# Patient Record
Sex: FEMALE | Race: WHITE | NOT HISPANIC OR LATINO | Employment: FULL TIME | URBAN - METROPOLITAN AREA
[De-identification: names, ages, dates, MRNs, and addresses within clinical notes are randomized per-mention and may not be internally consistent; named-entity substitution may affect disease eponyms.]

---

## 2017-02-20 ENCOUNTER — OFFICE VISIT (OUTPATIENT)
Dept: FAMILY MEDICINE | Facility: OTHER | Age: 37
End: 2017-02-20
Payer: COMMERCIAL

## 2017-02-20 VITALS
HEIGHT: 64 IN | HEART RATE: 72 BPM | TEMPERATURE: 96.8 F | SYSTOLIC BLOOD PRESSURE: 128 MMHG | WEIGHT: 166.2 LBS | BODY MASS INDEX: 28.38 KG/M2 | RESPIRATION RATE: 20 BRPM | DIASTOLIC BLOOD PRESSURE: 78 MMHG

## 2017-02-20 DIAGNOSIS — J06.9 UPPER RESPIRATORY TRACT INFECTION, UNSPECIFIED TYPE: ICD-10-CM

## 2017-02-20 DIAGNOSIS — R07.0 THROAT PAIN: Primary | ICD-10-CM

## 2017-02-20 LAB
DEPRECATED S PYO AG THROAT QL EIA: NORMAL
FLUAV+FLUBV AG SPEC QL: NEGATIVE
FLUAV+FLUBV AG SPEC QL: NORMAL
MICRO REPORT STATUS: NORMAL
SPECIMEN SOURCE: NORMAL
SPECIMEN SOURCE: NORMAL

## 2017-02-20 PROCEDURE — 87880 STREP A ASSAY W/OPTIC: CPT | Performed by: PHYSICIAN ASSISTANT

## 2017-02-20 PROCEDURE — 87081 CULTURE SCREEN ONLY: CPT | Performed by: PHYSICIAN ASSISTANT

## 2017-02-20 PROCEDURE — 99213 OFFICE O/P EST LOW 20 MIN: CPT | Performed by: PHYSICIAN ASSISTANT

## 2017-02-20 PROCEDURE — 87804 INFLUENZA ASSAY W/OPTIC: CPT | Performed by: PHYSICIAN ASSISTANT

## 2017-02-20 ASSESSMENT — PAIN SCALES - GENERAL: PAINLEVEL: SEVERE PAIN (6)

## 2017-02-20 NOTE — NURSING NOTE
"Chief Complaint   Patient presents with     URI     Pharyngitis     Panel Management     MyChart, Flu shot, Pap       Initial /78 (BP Location: Right arm, Patient Position: Chair, Cuff Size: Adult Regular)  Pulse 72  Temp 96.8  F (36  C) (Temporal)  Resp 20  Ht 5' 3.5\" (1.613 m)  Wt 166 lb 3.2 oz (75.4 kg)  BMI 28.98 kg/m2 Estimated body mass index is 28.98 kg/(m^2) as calculated from the following:    Height as of this encounter: 5' 3.5\" (1.613 m).    Weight as of this encounter: 166 lb 3.2 oz (75.4 kg).  Medication Reconciliation: izzy Kim, ANUPAM    "

## 2017-02-20 NOTE — PROGRESS NOTES
"  SUBJECTIVE:                                                    Kirstie Stoll is a 36 year old female who presents to clinic today for the following health issues:      Acute Illness   Acute illness concerns: sore throat  Onset: 1 week    Fever: YES    Chills/Sweats: YES    Headache (location?): YES    Sinus Pressure:YES    Conjunctivitis:  no    Ear Pain: YES: both    Rhinorrhea: YES    Congestion: YES    Sore Throat: YES     Cough: YES    Wheeze: YES    Decreased Appetite: no    Nausea: no    Vomiting: no    Diarrhea:  YES    Dysuria/Freq.: no    Fatigue/Achiness: YES    Sick/Strep Exposure: YES- co-worker on Monday has a staph infection     Therapies Tried and outcome: dayquil, ibuprofen, nyquil      -------------------------------------    Problem list and histories reviewed & adjusted, as indicated.  Additional history: as documented    BP Readings from Last 3 Encounters:   02/20/17 128/78   04/15/16 130/84   10/19/15 116/68    Wt Readings from Last 3 Encounters:   02/20/17 166 lb 3.2 oz (75.4 kg)   04/15/16 163 lb 12.8 oz (74.3 kg)   10/19/15 160 lb (72.6 kg)         Problem list, Medication list, Allergies, and Medical/Social/Surgical histories reviewed in EPIC and updated as appropriate.    ROS:  Constitutional, HEENT, cardiovascular, pulmonary, gi and gu systems are negative, except as otherwise noted.    OBJECTIVE:                                                    /78 (BP Location: Right arm, Patient Position: Chair, Cuff Size: Adult Regular)  Pulse 72  Temp 96.8  F (36  C) (Temporal)  Resp 20  Ht 5' 3.5\" (1.613 m)  Wt 166 lb 3.2 oz (75.4 kg)  BMI 28.98 kg/m2  Body mass index is 28.98 kg/(m^2).  GENERAL: alert and no distress  EYES: Eyes grossly normal to inspection, PERRL and conjunctivae and sclerae normal  HENT: normal cephalic/atraumatic, both ears: clear effusion, rhinorrhea clear, oropharynx clear, oral mucous membranes moist and sinuses: frontal tenderness on both sides  NECK: no " adenopathy  RESP: lungs clear to auscultation - no rales, rhonchi or wheezes  CV: regular rates and rhythm, peripheral pulses strong and no peripheral edema  MS: no gross musculoskeletal defects noted, no edema  SKIN: no suspicious lesions or rashes    Diagnostic Test Results:  Results for orders placed or performed in visit on 02/20/17 (from the past 24 hour(s))   Strep, Rapid Screen   Result Value Ref Range    Specimen Description Throat     Rapid Strep A Screen       NEGATIVE: No Group A streptococcal antigen detected by immunoassay, await   culture report.      Micro Report Status FINAL 02/20/2017    Influenza A/B antigen   Result Value Ref Range    Influenza A/B Agn Specimen Nasal     Influenza A Negative NEG    Influenza B  NEG     Negative   Test results must be correlated with clinical data. If necessary, results   should be confirmed by a molecular assay or viral culture.          ASSESSMENT/PLAN:                                                        ICD-10-CM    1. Throat pain R07.0 Strep, Rapid Screen     Influenza A/B antigen     Beta strep group A culture   2. Upper respiratory tract infection, unspecified type J06.9        See Patient Instructions    Cira Larios PA-C  [facility]

## 2017-02-20 NOTE — PATIENT INSTRUCTIONS
Sinusitis (No Antibiotics)    The sinuses are air-filled spaces within the bones of the face. They connect to the inside of the nose. Sinusitis is an inflammation of the tissue lining the sinus cavity. Sinus inflammation can occur during a cold. It can also be due to allergies to pollens and other particles in the air. It can cause symptoms such as sinus congestion, headache, sore throat, facial swelling and fullness. It may also cause a low-grade fever. No infection is present, and no antibiotic treatment is needed.  Home care    Drink plenty of water, hot tea, and other liquids. This may help thin mucus. It also may promote sinus drainage.    Heat may help soothe painful areas of the face. Use a towel soaked in hot water. Or,  the shower and direct the hot spray onto your face. Using a vaporizer along with a menthol rub at night may also help.     An expectorant containing guaifenesin may help thin the mucus and promote drainage from the sinuses.    Over-the-counter decongestants may be used unless a similar medicine was prescribed. Nasal sprays work the fastest. Use one that contains phenylephrine or oxymetazoline. First blow the nose gently. Then use the spray. Do not use these medicines more often than directed on the label or symptoms may get worse. You may also use tablets containing pseudoephedrine. Avoid products that combine ingredients, because side effects may be increased. Read labels. You can also ask the pharmacist for help. (NOTE: Persons with high blood pressure should not use decongestants. They can raise blood pressure.)    Over-the-counter antihistamines may help if allergies contributed to your sinusitis.      Use acetaminophen or ibuprofen to control pain, unless another pain medicine was prescribed. (If you have chronic liver or kidney disease or ever had a stomach ulcer, talk with your doctor before using these medicines. Aspirin should never be used in anyone under 18 years of age  who is ill with a fever. It may cause severe liver damage.)    Use nasal rinses or irrigation as instructed by your health care provider.    Don't smoke. This can worsen symptoms.  Follow-up care  Follow up with your healthcare provider or our staff if you are not improving within the next week.  When to seek medical advice  Call your healthcare provider if any of these occur:    Green or yellow discharge from the nose or into the throat    Facial pain or headache becoming more severe    Stiff neck    Unusual drowsiness or confusion    Swelling of the forehead or eyelids    Vision problems, including blurred or double vision    Fever of 100.4 F (38 C) or higher, or as directed by your healthcare provider    Seizure    Breathing problems    Symptoms not resolving within 10 days    0252-4120 [employer]. [address] [place] [address], [place] [address]. All rights reserved. This information is not intended as a substitute for professional medical care. Always follow your healthcare professional's instructions.

## 2017-02-20 NOTE — MR AVS SNAPSHOT
After Visit Summary   2/20/2017    Kirstie Stoll    MRN: [id number]           Patient Information     Date Of Birth          1980        Visit Information        Provider Department      2/20/2017 2:40 PM Cira Larios PA-C [facility]        Today's Diagnoses     Throat pain    -  1    Upper respiratory tract infection, unspecified type          Care Instructions      Sinusitis (No Antibiotics)    The sinuses are air-filled spaces within the bones of the face. They connect to the inside of the nose. Sinusitis is an inflammation of the tissue lining the sinus cavity. Sinus inflammation can occur during a cold. It can also be due to allergies to pollens and other particles in the air. It can cause symptoms such as sinus congestion, headache, sore throat, facial swelling and fullness. It may also cause a low-grade fever. No infection is present, and no antibiotic treatment is needed.  Home care    Drink plenty of water, hot tea, and other liquids. This may help thin mucus. It also may promote sinus drainage.    Heat may help soothe painful areas of the face. Use a towel soaked in hot water. Or,  the shower and direct the hot spray onto your face. Using a vaporizer along with a menthol rub at night may also help.     An expectorant containing guaifenesin may help thin the mucus and promote drainage from the sinuses.    Over-the-counter decongestants may be used unless a similar medicine was prescribed. Nasal sprays work the fastest. Use one that contains phenylephrine or oxymetazoline. First blow the nose gently. Then use the spray. Do not use these medicines more often than directed on the label or symptoms may get worse. You may also use tablets containing pseudoephedrine. Avoid products that combine ingredients, because side effects may be increased. Read labels. You can also ask the pharmacist for help. (NOTE: Persons with high blood pressure should not use  decongestants. They can raise blood pressure.)    Over-the-counter antihistamines may help if allergies contributed to your sinusitis.      Use acetaminophen or ibuprofen to control pain, unless another pain medicine was prescribed. (If you have chronic liver or kidney disease or ever had a stomach ulcer, talk with your doctor before using these medicines. Aspirin should never be used in anyone under 18 years of age who is ill with a fever. It may cause severe liver damage.)    Use nasal rinses or irrigation as instructed by your health care provider.    Don't smoke. This can worsen symptoms.  Follow-up care  Follow up with your healthcare provider or our staff if you are not improving within the next week.  When to seek medical advice  Call your healthcare provider if any of these occur:    Green or yellow discharge from the nose or into the throat    Facial pain or headache becoming more severe    Stiff neck    Unusual drowsiness or confusion    Swelling of the forehead or eyelids    Vision problems, including blurred or double vision    Fever of 100.4 F (38 C) or higher, or as directed by your healthcare provider    Seizure    Breathing problems    Symptoms not resolving within 10 days    5252-0757 [employer]. [address] [place] [address]. All rights reserved. This information is not intended as a substitute for professional medical care. Always follow your healthcare professional's instructions.              Follow-ups after your visit        Follow-up notes from your care team     Return if symptoms worsen or fail to improve.      Who to contact     If you have questions or need follow up information about today's clinic visit or your schedule please contact [facility] directly at [phone].  Normal or non-critical lab and imaging results will be communicated to you by MyChart, letter or phone within 4 business days after the clinic has received the results. If you  "do not hear from us within 7 days, please contact the clinic through [employer] or phone. If you have a critical or abnormal lab result, we will notify you by phone as soon as possible.  Submit refill requests through [employer] or call your pharmacy and they will forward the refill request to us. Please allow 3 business days for your refill to be completed.          Additional Information About Your Visit        [employer] Information     [employer] lets you send messages to your doctor, view your test results, renew your prescriptions, schedule appointments and more. To sign up, go to [email or web address] . Click on \"Log in\" on the left side of the screen, which will take you to the Welcome page. Then click on \"Sign up Now\" on the right side of the page.     You will be asked to enter the access code listed below, as well as some personal information. Please follow the directions to create your username and password.     Your access code is: [email or web address]  Expires: 2017  3:04 PM     Your access code will  in 90 days. If you need help or a new code, please call your [facility] clinic or [phone].        Care EveryWhere ID     This is your Care EveryWhere ID. This could be used by other organizations to access your [facility] medical records  [id number]        Your Vitals Were     Pulse Temperature Respirations Height BMI (Body Mass Index)       72 96.8  F (36  C) (Temporal) 20 5' 3.5\" (1.613 m) 28.98 kg/m2        Blood Pressure from Last 3 Encounters:   17 128/78   04/15/16 130/84   10/19/15 116/68    Weight from Last 3 Encounters:   17 166 lb 3.2 oz (75.4 kg)   04/15/16 163 lb 12.8 oz (74.3 kg)   10/19/15 160 lb (72.6 kg)              We Performed the Following     Beta strep group A culture     Influenza A/B antigen     Strep, Rapid Screen        Primary Care Provider Office Phone # Fax #    Inez Salter [phone]       [address] " [address]        Thank you!     Thank you for choosing [facility]  for your care. Our goal is always to provide you with excellent care. Hearing back from our patients is one way we can continue to improve our services. Please take a few minutes to complete the written survey that you may receive in the mail after your visit with us. Thank you!             Your Updated Medication List - Protect others around you: Learn how to safely use, store and throw away your medicines at www.disposemymeds.org.          This list is accurate as of: 2/20/17  3:04 PM.  Always use your most recent med list.                   Brand Name Dispense Instructions for use    albuterol 108 (90 BASE) MCG/ACT Inhaler    PROAIR HFA/PROVENTIL HFA/VENTOLIN HFA    1 Inhaler    Inhale 2 puffs into the lungs every 6 hours as needed for shortness of breath / dyspnea or wheezing

## 2017-02-22 LAB
BACTERIA SPEC CULT: NORMAL
MICRO REPORT STATUS: NORMAL
SPECIMEN SOURCE: NORMAL

## 2017-03-15 ENCOUNTER — TELEPHONE (OUTPATIENT)
Dept: FAMILY MEDICINE | Facility: OTHER | Age: 37
End: 2017-03-15

## 2017-03-15 NOTE — TELEPHONE ENCOUNTER
Summary:    Patient is due/failing the following:   PAP    Action needed:   Patient needs office visit for PAP.    Type of outreach:    Phone, spoke to patient.  patient declines at this time.     Questions for provider review:    None                                                                                                                                    Maira Ramirez       Chart routed to Care Team .        Panel Management Review      Patient has the following on her problem list: None      Composite cancer screening  Chart review shows that this patient is due/due soon for the following Pap Smear

## 2017-05-26 ENCOUNTER — HEALTH MAINTENANCE LETTER (OUTPATIENT)
Age: 37
End: 2017-05-26

## 2017-06-18 ENCOUNTER — HOSPITAL ENCOUNTER (EMERGENCY)
Facility: CLINIC | Age: 37
Discharge: HOME | End: 2017-06-18
Attending: FAMILY MEDICINE | Admitting: FAMILY MEDICINE
Payer: COMMERCIAL

## 2017-06-18 ENCOUNTER — APPOINTMENT (OUTPATIENT)
Dept: CT IMAGING | Facility: CLINIC | Age: 37
End: 2017-06-18
Attending: FAMILY MEDICINE
Payer: COMMERCIAL

## 2017-06-18 VITALS
SYSTOLIC BLOOD PRESSURE: 125 MMHG | RESPIRATION RATE: 16 BRPM | HEIGHT: 64 IN | HEART RATE: 104 BPM | BODY MASS INDEX: 28.38 KG/M2 | WEIGHT: 166.25 LBS | DIASTOLIC BLOOD PRESSURE: 91 MMHG | OXYGEN SATURATION: 97 % | TEMPERATURE: 98 F

## 2017-06-18 DIAGNOSIS — A08.4 VIRAL GASTROENTERITIS: ICD-10-CM

## 2017-06-18 LAB
ALBUMIN SERPL-MCNC: 3.6 G/DL (ref 3.4–5)
ALBUMIN UR-MCNC: NEGATIVE MG/DL
ALP SERPL-CCNC: 94 U/L (ref 40–150)
ALT SERPL W P-5'-P-CCNC: 20 U/L (ref 0–50)
ANION GAP SERPL CALCULATED.3IONS-SCNC: 7 MMOL/L (ref 3–14)
APPEARANCE UR: ABNORMAL
AST SERPL W P-5'-P-CCNC: 16 U/L (ref 0–45)
BACTERIA #/AREA URNS HPF: ABNORMAL /HPF
BASOPHILS # BLD AUTO: 0.1 10E9/L (ref 0–0.2)
BASOPHILS NFR BLD AUTO: 1.1 %
BILIRUB SERPL-MCNC: 0.4 MG/DL (ref 0.2–1.3)
BILIRUB UR QL STRIP: NEGATIVE
BUN SERPL-MCNC: 11 MG/DL (ref 7–30)
CALCIUM SERPL-MCNC: 8.2 MG/DL (ref 8.5–10.1)
CHLORIDE SERPL-SCNC: 109 MMOL/L (ref 94–109)
CO2 SERPL-SCNC: 26 MMOL/L (ref 20–32)
COLOR UR AUTO: YELLOW
CREAT SERPL-MCNC: 0.75 MG/DL (ref 0.52–1.04)
DIFFERENTIAL METHOD BLD: NORMAL
EOSINOPHIL # BLD AUTO: 0.3 10E9/L (ref 0–0.7)
EOSINOPHIL NFR BLD AUTO: 4.3 %
ERYTHROCYTE [DISTWIDTH] IN BLOOD BY AUTOMATED COUNT: 12.6 % (ref 10–15)
GFR SERPL CREATININE-BSD FRML MDRD: 87 ML/MIN/1.7M2
GLUCOSE SERPL-MCNC: 100 MG/DL (ref 70–99)
GLUCOSE UR STRIP-MCNC: NEGATIVE MG/DL
HCG UR QL: NEGATIVE
HCT VFR BLD AUTO: 40.6 % (ref 35–47)
HGB BLD-MCNC: 13.8 G/DL (ref 11.7–15.7)
HGB UR QL STRIP: NEGATIVE
IMM GRANULOCYTES # BLD: 0 10E9/L (ref 0–0.4)
IMM GRANULOCYTES NFR BLD: 0.5 %
KETONES UR STRIP-MCNC: NEGATIVE MG/DL
LEUKOCYTE ESTERASE UR QL STRIP: ABNORMAL
LIPASE SERPL-CCNC: 191 U/L (ref 73–393)
LYMPHOCYTES # BLD AUTO: 1.8 10E9/L (ref 0.8–5.3)
LYMPHOCYTES NFR BLD AUTO: 26.9 %
MCH RBC QN AUTO: 29.8 PG (ref 26.5–33)
MCHC RBC AUTO-ENTMCNC: 34 G/DL (ref 31.5–36.5)
MCV RBC AUTO: 88 FL (ref 78–100)
MONOCYTES # BLD AUTO: 0.9 10E9/L (ref 0–1.3)
MONOCYTES NFR BLD AUTO: 13.8 %
MUCOUS THREADS #/AREA URNS LPF: PRESENT /LPF
NEUTROPHILS # BLD AUTO: 3.5 10E9/L (ref 1.6–8.3)
NEUTROPHILS NFR BLD AUTO: 53.4 %
NITRATE UR QL: NEGATIVE
PH UR STRIP: 9 PH (ref 5–7)
PLATELET # BLD AUTO: 313 10E9/L (ref 150–450)
POTASSIUM SERPL-SCNC: 4 MMOL/L (ref 3.4–5.3)
PROT SERPL-MCNC: 7 G/DL (ref 6.8–8.8)
RBC # BLD AUTO: 4.63 10E12/L (ref 3.8–5.2)
RBC #/AREA URNS AUTO: 3 /HPF (ref 0–2)
SODIUM SERPL-SCNC: 142 MMOL/L (ref 133–144)
SP GR UR STRIP: 1.02 (ref 1–1.03)
SQUAMOUS #/AREA URNS AUTO: 7 /HPF (ref 0–1)
URN SPEC COLLECT METH UR: ABNORMAL
UROBILINOGEN UR STRIP-MCNC: 0 MG/DL (ref 0–2)
WBC # BLD AUTO: 6.5 10E9/L (ref 4–11)
WBC #/AREA URNS AUTO: 1 /HPF (ref 0–2)

## 2017-06-18 PROCEDURE — 36415 COLL VENOUS BLD VENIPUNCTURE: CPT | Performed by: FAMILY MEDICINE

## 2017-06-18 PROCEDURE — 99285 EMERGENCY DEPT VISIT HI MDM: CPT | Mod: 25 | Performed by: FAMILY MEDICINE

## 2017-06-18 PROCEDURE — 25000128 H RX IP 250 OP 636: Performed by: FAMILY MEDICINE

## 2017-06-18 PROCEDURE — 99285 EMERGENCY DEPT VISIT HI MDM: CPT | Mod: Z6 | Performed by: FAMILY MEDICINE

## 2017-06-18 PROCEDURE — 96374 THER/PROPH/DIAG INJ IV PUSH: CPT | Mod: 59 | Performed by: FAMILY MEDICINE

## 2017-06-18 PROCEDURE — 81025 URINE PREGNANCY TEST: CPT | Performed by: FAMILY MEDICINE

## 2017-06-18 PROCEDURE — 80053 COMPREHEN METABOLIC PANEL: CPT | Performed by: FAMILY MEDICINE

## 2017-06-18 PROCEDURE — 25000125 ZZHC RX 250: Performed by: FAMILY MEDICINE

## 2017-06-18 PROCEDURE — 83690 ASSAY OF LIPASE: CPT | Performed by: FAMILY MEDICINE

## 2017-06-18 PROCEDURE — 81001 URINALYSIS AUTO W/SCOPE: CPT | Performed by: FAMILY MEDICINE

## 2017-06-18 PROCEDURE — 74177 CT ABD & PELVIS W/CONTRAST: CPT

## 2017-06-18 PROCEDURE — 85025 COMPLETE CBC W/AUTO DIFF WBC: CPT | Performed by: FAMILY MEDICINE

## 2017-06-18 PROCEDURE — 96375 TX/PRO/DX INJ NEW DRUG ADDON: CPT | Performed by: FAMILY MEDICINE

## 2017-06-18 RX ORDER — HYDROMORPHONE HYDROCHLORIDE 1 MG/ML
0.5 INJECTION, SOLUTION INTRAMUSCULAR; INTRAVENOUS; SUBCUTANEOUS
Status: DISCONTINUED | OUTPATIENT
Start: 2017-06-18 | End: 2017-06-18 | Stop reason: HOSPADM

## 2017-06-18 RX ORDER — HYDROCODONE BITARTRATE AND ACETAMINOPHEN 5; 325 MG/1; MG/1
1-2 TABLET ORAL EVERY 4 HOURS PRN
Qty: 15 TABLET | Refills: 0 | Status: SHIPPED | OUTPATIENT
Start: 2017-06-18 | End: 2018-03-02

## 2017-06-18 RX ORDER — LIDOCAINE 40 MG/G
CREAM TOPICAL
Status: DISCONTINUED | OUTPATIENT
Start: 2017-06-18 | End: 2017-06-18 | Stop reason: HOSPADM

## 2017-06-18 RX ORDER — CALCIUM CARBONATE 500 MG/1
1 TABLET, CHEWABLE ORAL DAILY
COMMUNITY
End: 2018-03-02

## 2017-06-18 RX ORDER — IOPAMIDOL 755 MG/ML
100 INJECTION, SOLUTION INTRAVASCULAR ONCE
Status: COMPLETED | OUTPATIENT
Start: 2017-06-18 | End: 2017-06-18

## 2017-06-18 RX ORDER — KETOROLAC TROMETHAMINE 30 MG/ML
30 INJECTION, SOLUTION INTRAMUSCULAR; INTRAVENOUS ONCE
Status: COMPLETED | OUTPATIENT
Start: 2017-06-18 | End: 2017-06-18

## 2017-06-18 RX ADMIN — HYDROMORPHONE HYDROCHLORIDE 0.5 MG: 1 INJECTION, SOLUTION INTRAMUSCULAR; INTRAVENOUS; SUBCUTANEOUS at 16:18

## 2017-06-18 RX ADMIN — IOPAMIDOL 85 ML: 755 INJECTION, SOLUTION INTRAVENOUS at 17:53

## 2017-06-18 RX ADMIN — KETOROLAC TROMETHAMINE 30 MG: 30 INJECTION, SOLUTION INTRAMUSCULAR at 16:18

## 2017-06-18 RX ADMIN — SODIUM CHLORIDE 60 ML: 9 INJECTION, SOLUTION INTRAVENOUS at 17:53

## 2017-06-18 ASSESSMENT — ENCOUNTER SYMPTOMS
LIGHT-HEADEDNESS: 1
NAUSEA: 1
DIARRHEA: 1
FEVER: 0
CHILLS: 0
DYSURIA: 0
FREQUENCY: 0
ABDOMINAL PAIN: 1
BLOOD IN STOOL: 0
FLANK PAIN: 0
VOMITING: 0
DIZZINESS: 1

## 2017-06-18 NOTE — ED AVS SNAPSHOT
[facility]    [address] [facility] [facility] [address]     [place] [address]    Phone:  [phone]    Fax:  [phone]                                       Kirstie Stoll   MRN: [id number]    Department:  [facility] Department   Date of Visit:  6/18/2017           Patient Information     Date Of Birth          1980        Your diagnoses for this visit were:     Viral gastroenteritis        You were seen by Sang Rios MD.      Follow-up Information     Follow up with Inez Salter MD. Schedule an appointment as soon as possible for a visit in 2 days.    Specialty:  Family Practice    Why:  If not improving.    Contact information:    [facility]  [address] [facility] [address]  [phone]          Discharge Instructions         Viral Gastroenteritis (Adult)    Gastroenteritis is commonly called the stomach flu. It is most often caused by a virus that affects the stomach and intestinal tract and usually lasts from 2 to 7 days. Common viruses causing gastroenteritis include norovirus, rotavirus, and hepatitis A. Non-viral causes of gastroenteritis include bacteria, parasites, and toxins.  The danger from repeated vomiting or diarrhea is dehydration. This is the loss of too much fluid from the body. When this occurs, body fluids must be replaced. Antibiotics do not help with this illness because it is usually viral.Simple home treatment will be helpful.  Symptoms of viral gastroenteritis may include:    Watery, loose stools    Stomach pain or abdominal cramps    Fever and chills    Nausea and vomiting    Loss of bowel control    Headache  Home care  Gastroenteritis is transmitted by contact with the stool or vomit of an infected person. This can occur from person to person or from contact with a contaminated surface.  Follow these guidelines when caring for yourself at home:    If symptoms are severe, rest at home for the next 24 hours or until you  are feeling better.    Wash your hands with soap and water or use alcohol-based  to prevent the spread of infection. Wash your hands after touching anyone who is sick.    Wash your hands or use alcohol-based  after using the toilet and before meals. Clean the toilet after each use.  Remember these tips when preparing food:    People with diarrhea should not prepare or serve food to others. When preparing foods, wash your hands before and after.    Wash your hands after using cutting boards, countertops, knives, or utensils that have been in contact with raw food.    Keep uncooked meats away from cooked and ready-to-eat foods.  Medicine  You may use acetaminophen or NSAID medicines like ibuprofen or naproxen to control fever unless another medicine was given. If you have chronic liver or kidney disease, talk with your healthcare provider before using these medicines. Also talk with your provider if you've had a stomach ulcer or gastrointestinal bleeding. Don't give aspirin to anyone under 18 years of age who is ill with a fever. It may cause severe liver damage. Don't use NSAIDS is you are already taking one for another condition (like arthritis) or are on aspirin (such as for heart disease or after a stroke).  If medicine for vomiting or diarrhea are prescribed, take these only as directed. Do not take over-the-counter medicines for vomiting or diarrhea unless instructed by your healthcare provider.  Diet  Follow these guidelines for food:    Water and liquids are important so you don't get dehydrated. Drink a small amount at a time or suck on ice chips if you are vomiting.    If you eat, avoid fatty, greasy, spicy, or fried foods.    Don't eat dairy if you have diarrhea. This can make diarrhea worse.    Avoid tobacco, alcohol, and caffeine which may worsen symptoms.  During the first 24 hours (the first full day), follow the diet below:    Beverages. Sports drinks, soft drinks without caffeine,  ginger ale, mineral water (plain or flavored), decaffeinated tea and coffee. If you are very dehydrated, sports drinks aren't a good choice. They have too much sugar and not enough electrolytes. In this case, commercially available products called oral rehydration solutions, are best.    Soups. Eat clear broth, consommé, and bouillon.    Desserts. Eat gelatin, popsicles, and fruit juice bars.  During the next 24 hours (the second day), you may add the following to the above:    Hot cereal, plain toast, bread, rolls, and crackers    Plain noodles, rice, mashed potatoes, chicken noodle or rice soup    Unsweetened canned fruit (avoid pineapple), bananas    Limit fat intake to less than 15 grams per day. Do this by avoiding margarine, butter, oils, mayonnaise, sauces, gravies, fried foods, peanut butter, meat, poultry, and fish.    Limit fiber and avoid raw or cooked vegetables, fresh fruits (except bananas), and bran cereals.    Limit caffeine and chocolate. Don't use spices or seasonings other than salt.    Limit dairy products.    Avoid alcohol.  During the next 24 hours:    Gradually resume a normal diet as you feel better and your symptoms improve.    If at any time it starts getting worse again, go back to clear liquids until you feel better.  Follow-up care  Follow up with your healthcare provider, or as advised. Call your provider if you don't get better within 24 hours or if diarrhea lasts more than a week. Also follow up if you are unable to keep down liquids and get dehydrated. If a stool (diarrhea) sample was taken, call as directed for the results.  Call 911  Call 911 if any of these occur:    Trouble breathing    Chest pain    Confused    Severe drowsiness or trouble awakening    Fainting or loss of consciousness    Rapid heart rate    Seizure    Stiff neck  When to seek medical advice  Call your healthcare provider right away if any of these occur:    Abdominal pain that gets worse    Continued vomiting  (unable to keep liquids down)    Frequent diarrhea (more than 5 times a day)    Blood in vomit or stool (black or red color)    Dark urine, reduced urine output, or extreme thirst    Weakness or dizziness    Drowsiness    Fever of 100.4 F (38 C) oral or higher that does not get better with fever medicine    New rash  Date Last Reviewed: 1/3/2016    8828-2046 [employer]. [address] [place] [address], [place] [address]. All rights reserved. This information is not intended as a substitute for professional medical care. Always follow your healthcare professional's instructions.          24 Hour Appointment Hotline       To make an appointment at any [facility] clinic, call [phone] ([phone]). If you don't have a family doctor or clinic, we will help you find one. [facility] clinics are conveniently located to serve the needs of you and your family.             Review of your medicines      START taking        Dose / Directions Last dose taken    HYDROcodone-acetaminophen 5-325 MG per tablet   Commonly known as:  NORCO   Dose:  1-2 tablet   Quantity:  15 tablet        Take 1-2 tablets by mouth every 4 hours as needed for moderate to severe pain   Refills:  0          Our records show that you are taking the medicines listed below. If these are incorrect, please call your family doctor or clinic.        Dose / Directions Last dose taken    calcium carbonate 500 MG chewable tablet   Commonly known as:  TUMS   Dose:  1 chew tab        Take 1 chew tab by mouth daily   Refills:  0                Prescriptions were sent or printed at these locations (1 Prescription)                   [facility] [place] [address] [facility]    [address], [facility] [address]    Telephone:  [phone]   Fax:  [phone]   Hours:                  Printed at Department/Unit printer (1 of 1)         HYDROcodone-acetaminophen (NORCO) 5-325 MG per tablet                Procedures and tests performed  "during your visit     CBC with platelets differential    CT Abdomen Pelvis w Contrast    Comprehensive metabolic panel    HCG qualitative urine    Lipase    Peripheral IV catheter    UA with Microscopic      Orders Needing Specimen Collection     None      Pending Results     Date and Time Order Name Status Description    2017 1724 CT Abdomen Pelvis w Contrast Preliminary             Pending Culture Results     No orders found from 2017 to 2017.            Pending Results Instructions     If you had any lab results that were not finalized at the time of your Discharge, you can call the ED Lab Result RN at [phone]. You will be contacted by this team for any positive Lab results or changes in treatment. The nurses are available 7 days a week from 10A to 6:30P.  You can leave a message 24 hours per day and they will return your call.        Thank you for choosing [facility]       Thank you for choosing [facility] for your care. Our goal is always to provide you with excellent care. Hearing back from our patients is one way we can continue to improve our services. Please take a few minutes to complete the written survey that you may receive in the mail after you visit with us. Thank you!        [employer] Information     [employer] lets you send messages to your doctor, view your test results, renew your prescriptions, schedule appointments and more. To sign up, go to [email or web address] . Click on \"Log in\" on the left side of the screen, which will take you to the Welcome page. Then click on \"Sign up Now\" on the right side of the page.     You will be asked to enter the access code listed below, as well as some personal information. Please follow the directions to create your username and password.     Your access code is: [email or web address]  Expires: 2017  6:19 PM     Your access code will  in 90 days. If you need help or a new code, please call your [facility] clinic or [phone].        Care " EveryWhere ID     This is your Care EveryWhere ID. This could be used by other organizations to access your [facility] medical records  [id number]        After Visit Summary       This is your record. Keep this with you and show to your community pharmacist(s) and doctor(s) at your next visit.

## 2017-06-18 NOTE — DISCHARGE INSTRUCTIONS
Viral Gastroenteritis (Adult)    Gastroenteritis is commonly called the stomach flu. It is most often caused by a virus that affects the stomach and intestinal tract and usually lasts from 2 to 7 days. Common viruses causing gastroenteritis include norovirus, rotavirus, and hepatitis A. Non-viral causes of gastroenteritis include bacteria, parasites, and toxins.  The danger from repeated vomiting or diarrhea is dehydration. This is the loss of too much fluid from the body. When this occurs, body fluids must be replaced. Antibiotics do not help with this illness because it is usually viral.Simple home treatment will be helpful.  Symptoms of viral gastroenteritis may include:    Watery, loose stools    Stomach pain or abdominal cramps    Fever and chills    Nausea and vomiting    Loss of bowel control    Headache  Home care  Gastroenteritis is transmitted by contact with the stool or vomit of an infected person. This can occur from person to person or from contact with a contaminated surface.  Follow these guidelines when caring for yourself at home:    If symptoms are severe, rest at home for the next 24 hours or until you are feeling better.    Wash your hands with soap and water or use alcohol-based  to prevent the spread of infection. Wash your hands after touching anyone who is sick.    Wash your hands or use alcohol-based  after using the toilet and before meals. Clean the toilet after each use.  Remember these tips when preparing food:    People with diarrhea should not prepare or serve food to others. When preparing foods, wash your hands before and after.    Wash your hands after using cutting boards, countertops, knives, or utensils that have been in contact with raw food.    Keep uncooked meats away from cooked and ready-to-eat foods.  Medicine  You may use acetaminophen or NSAID medicines like ibuprofen or naproxen to control fever unless another medicine was given. If you have chronic  liver or kidney disease, talk with your healthcare provider before using these medicines. Also talk with your provider if you've had a stomach ulcer or gastrointestinal bleeding. Don't give aspirin to anyone under 18 years of age who is ill with a fever. It may cause severe liver damage. Don't use NSAIDS is you are already taking one for another condition (like arthritis) or are on aspirin (such as for heart disease or after a stroke).  If medicine for vomiting or diarrhea are prescribed, take these only as directed. Do not take over-the-counter medicines for vomiting or diarrhea unless instructed by your healthcare provider.  Diet  Follow these guidelines for food:    Water and liquids are important so you don't get dehydrated. Drink a small amount at a time or suck on ice chips if you are vomiting.    If you eat, avoid fatty, greasy, spicy, or fried foods.    Don't eat dairy if you have diarrhea. This can make diarrhea worse.    Avoid tobacco, alcohol, and caffeine which may worsen symptoms.  During the first 24 hours (the first full day), follow the diet below:    Beverages. Sports drinks, soft drinks without caffeine, ginger ale, mineral water (plain or flavored), decaffeinated tea and coffee. If you are very dehydrated, sports drinks aren't a good choice. They have too much sugar and not enough electrolytes. In this case, commercially available products called oral rehydration solutions, are best.    Soups. Eat clear broth, consommé, and bouillon.    Desserts. Eat gelatin, popsicles, and fruit juice bars.  During the next 24 hours (the second day), you may add the following to the above:    Hot cereal, plain toast, bread, rolls, and crackers    Plain noodles, rice, mashed potatoes, chicken noodle or rice soup    Unsweetened canned fruit (avoid pineapple), bananas    Limit fat intake to less than 15 grams per day. Do this by avoiding margarine, butter, oils, mayonnaise, sauces, gravies, fried foods, peanut  butter, meat, poultry, and fish.    Limit fiber and avoid raw or cooked vegetables, fresh fruits (except bananas), and bran cereals.    Limit caffeine and chocolate. Don't use spices or seasonings other than salt.    Limit dairy products.    Avoid alcohol.  During the next 24 hours:    Gradually resume a normal diet as you feel better and your symptoms improve.    If at any time it starts getting worse again, go back to clear liquids until you feel better.  Follow-up care  Follow up with your healthcare provider, or as advised. Call your provider if you don't get better within 24 hours or if diarrhea lasts more than a week. Also follow up if you are unable to keep down liquids and get dehydrated. If a stool (diarrhea) sample was taken, call as directed for the results.  Call 911  Call 911 if any of these occur:    Trouble breathing    Chest pain    Confused    Severe drowsiness or trouble awakening    Fainting or loss of consciousness    Rapid heart rate    Seizure    Stiff neck  When to seek medical advice  Call your healthcare provider right away if any of these occur:    Abdominal pain that gets worse    Continued vomiting (unable to keep liquids down)    Frequent diarrhea (more than 5 times a day)    Blood in vomit or stool (black or red color)    Dark urine, reduced urine output, or extreme thirst    Weakness or dizziness    Drowsiness    Fever of 100.4 F (38 C) oral or higher that does not get better with fever medicine    New rash  Date Last Reviewed: 1/3/2016    8594-1152 [employer]. [address] [place] [address], [place] [address]. All rights reserved. This information is not intended as a substitute for professional medical care. Always follow your healthcare professional's instructions.

## 2017-06-18 NOTE — ED PROVIDER NOTES
"  History     Chief Complaint   Patient presents with     Abdominal Pain     Associated with frequent diarrhea. Since Friday.      The history is provided by the patient.     Kirstie Stoll is a 36 year old female who presents to the ED for evaluation of abdominal pain. Patient has had severe abdominal pain since Friday along with diarrhea. The abdominal pain is described as \"cramping.\" Has been having 5-10 bowel movements a day. Has noticed the diarrhea being really watery but has not noticed any blood in her stools. Bowel movements do not make abdominal pain disappear. Has been lightheaded and dizzy since Friday. No recent travel or cramping. Drinks city water. Denies urinary symptoms, vomiting or any other associated symptoms.       Patient Active Problem List   Diagnosis     CARDIOVASCULAR SCREENING; LDL GOAL LESS THAN 160     Menometrorrhagia     Anxiety attack     Endometrial polyp     Past Medical History:   Diagnosis Date     Vaginal bleeding, abnormal     ongoing since about 2010       Past Surgical History:   Procedure Laterality Date     DILATION AND CURETTAGE, HYSTEROSCOPY DIAGNOSTIC, COMBINED  11/15/2012    Procedure: COMBINED DILATION AND CURETTAGE, HYSTEROSCOPY DIAGNOSTIC;  Hysteroscopy, D&C, polypectomy;  Surgeon: Joslyn Rosales DO;  Location: [facility]     OPEN REDUCTION INTERNAL FIXATION WRIST      right wrist       Family History   Problem Relation Age of Onset     Gynecology Mother      early menopause     Cardiovascular Father      anerysm     Gynecology Sister      early menopause     OSTEOPOROSIS Sister        Social History   Substance Use Topics     Smoking status: Former Smoker     Types: Cigarettes     Quit date: 5/9/2013     Smokeless tobacco: Never Used     Alcohol use 3.0 oz/week     6 Cans of beer per week        Immunization History   Administered Date(s) Administered     TDAP Vaccine (Adacel) 10/11/2012          Allergies   Allergen Reactions     Amoxicillin Swelling " "      Current Outpatient Prescriptions   Medication Sig Dispense Refill     calcium carbonate (TUMS) 500 MG chewable tablet Take 1 chew tab by mouth daily       HYDROcodone-acetaminophen (NORCO) 5-325 MG per tablet Take 1-2 tablets by mouth every 4 hours as needed for moderate to severe pain 15 tablet 0         I have reviewed the Medications, Allergies, Past Medical and Surgical History, and Social History in the Epic system.        Review of Systems   Constitutional: Negative for chills and fever.   Gastrointestinal: Positive for abdominal pain, diarrhea and nausea. Negative for blood in stool and vomiting.   Genitourinary: Negative for dysuria, flank pain, frequency and urgency.   Skin: Negative for rash.   Neurological: Positive for dizziness and light-headedness.   All other systems reviewed and are negative.      Physical Exam   BP: (!) 144/100  Pulse: 104  Temp: 98  F (36.7  C)  Resp: 16  Height: 162.6 cm (5' 4\")  Weight: 75.4 kg (166 lb 4 oz)  SpO2: 99 %    Physical Exam   Constitutional: She is oriented to person, place, and time. She appears well-developed and well-nourished. No distress.   HENT:   Head: Normocephalic and atraumatic.   Eyes: Conjunctivae and EOM are normal. Pupils are equal, round, and reactive to light. No scleral icterus.   Neck: Normal range of motion. Neck supple.   Abdominal: Soft. She exhibits no distension. There is tenderness in the right upper quadrant and right lower quadrant. There is no rigidity, no rebound, no guarding, no CVA tenderness, no tenderness at McBurney's point and negative Garcia's sign.   Neurological: She is alert and oriented to person, place, and time.   Skin: Skin is warm and dry. No rash noted. No pallor.   Psychiatric: She has a normal mood and affect. Her behavior is normal.   Nursing note and vitals reviewed.    ED Course     ED Course     Procedures             Results for orders placed or performed during the hospital encounter of 06/18/17 (from the past " 24 hour(s))   UA with Microscopic   Result Value Ref Range    Color Urine Yellow     Appearance Urine Slightly Cloudy     Glucose Urine Negative NEG mg/dL    Bilirubin Urine Negative NEG    Ketones Urine Negative NEG mg/dL    Specific Gravity Urine 1.018 1.003 - 1.035    Blood Urine Negative NEG    pH Urine 9.0 (H) 5.0 - 7.0 pH    Protein Albumin Urine Negative NEG mg/dL    Urobilinogen mg/dL 0.0 0.0 - 2.0 mg/dL    Nitrite Urine Negative NEG    Leukocyte Esterase Urine Trace (A) NEG    Source Midstream Urine     WBC Urine 1 0 - 2 /HPF    RBC Urine 3 (H) 0 - 2 /HPF    Bacteria Urine Few (A) NEG /HPF    Squamous Epithelial /HPF Urine 7 (H) 0 - 1 /HPF    Mucous Urine Present (A) NEG /LPF   HCG qualitative urine   Result Value Ref Range    HCG Qual Urine Negative NEG   CBC with platelets differential   Result Value Ref Range    WBC 6.5 4.0 - 11.0 10e9/L    RBC Count 4.63 3.8 - 5.2 10e12/L    Hemoglobin 13.8 11.7 - 15.7 g/dL    Hematocrit 40.6 35.0 - 47.0 %    MCV 88 78 - 100 fl    MCH 29.8 26.5 - 33.0 pg    MCHC 34.0 31.5 - 36.5 g/dL    RDW 12.6 10.0 - 15.0 %    Platelet Count 313 150 - 450 10e9/L    Diff Method Automated Method     % Neutrophils 53.4 %    % Lymphocytes 26.9 %    % Monocytes 13.8 %    % Eosinophils 4.3 %    % Basophils 1.1 %    % Immature Granulocytes 0.5 %    Absolute Neutrophil 3.5 1.6 - 8.3 10e9/L    Absolute Lymphocytes 1.8 0.8 - 5.3 10e9/L    Absolute Monocytes 0.9 0.0 - 1.3 10e9/L    Absolute Eosinophils 0.3 0.0 - 0.7 10e9/L    Absolute Basophils 0.1 0.0 - 0.2 10e9/L    Abs Immature Granulocytes 0.0 0 - 0.4 10e9/L   Comprehensive metabolic panel   Result Value Ref Range    Sodium 142 133 - 144 mmol/L    Potassium 4.0 3.4 - 5.3 mmol/L    Chloride 109 94 - 109 mmol/L    Carbon Dioxide 26 20 - 32 mmol/L    Anion Gap 7 3 - 14 mmol/L    Glucose 100 (H) 70 - 99 mg/dL    Urea Nitrogen 11 7 - 30 mg/dL    Creatinine 0.75 0.52 - 1.04 mg/dL    GFR Estimate 87 >60 mL/min/1.7m2    GFR Estimate If Black  >90   GFR Calc   >60 mL/min/1.7m2    Calcium 8.2 (L) 8.5 - 10.1 mg/dL    Bilirubin Total 0.4 0.2 - 1.3 mg/dL    Albumin 3.6 3.4 - 5.0 g/dL    Protein Total 7.0 6.8 - 8.8 g/dL    Alkaline Phosphatase 94 40 - 150 U/L    ALT 20 0 - 50 U/L    AST 16 0 - 45 U/L   Lipase   Result Value Ref Range    Lipase 191 73 - 393 U/L   CT Abdomen Pelvis w Contrast    Narrative    CT ABDOMEN AND PELVIS WITH CONTRAST 6/18/2017 6:02 PM     HISTORY: RLQ/RUQ abdominal pain.    COMPARISON: None.    TECHNIQUE: Volumetric helical acquisition of CT images from the lung  bases through the symphysis pubis after the administration of 85 mL  Isovue 370  intravenous contrast. Radiation dose for this scan was  reduced using automated exposure control, adjustment of the mA and/or  kV according to patient size, or iterative reconstruction technique.    FINDINGS: The liver, bilateral kidneys and adrenal glands, pancreas,  and spleen demonstrate no worrisome focal lesion. Visualized appendix  unremarkable. There is a small amount of pelvic free fluid which is  nonspecific and may be physiologic. Small cyst or follicle in the left  ovary. Pelvic structures otherwise unremarkable. No diverticulitis. No  hydronephrosis. Unremarkable gallbladder. No free air in the abdomen.  Bone windows reveal no destructive lesions. There are no abdominal or  pelvic lymph nodes that are abnormal by size criteria. The visualized  lung bases are unremarkable. There are no dilated loops of small bowel  or colon.      Impression    IMPRESSION: No acute process demonstrated within the abdomen and  pelvis.    ALONZO GIBSON MD       Medications   lidocaine 1 % 1 mL (not administered)   lidocaine (LMX4) kit (not administered)   sodium chloride (PF) 0.9% PF flush 3 mL (not administered)   sodium chloride (PF) 0.9% PF flush 3 mL (3 mLs Intracatheter Given 6/18/17 1610)   HYDROmorphone (PF) (DILAUDID) injection 0.5 mg (0.5 mg Intravenous Given 6/18/17 1618)    ketorolac (TORADOL) injection 30 mg (30 mg Intravenous Given 6/18/17 1618)   sodium chloride (PF) 0.9% PF flush 3 mL (3 mLs Intravenous Given 6/18/17 1753)   sodium chloride 0.9 % for CT scan flush dose 500 mL (60 mLs Intravenous Given 6/18/17 1753)   iopamidol (ISOVUE-370) solution 100 mL (85 mLs Intravenous Given 6/18/17 1753)       Labs were reviewed and were unremarkable.  She still had significant right-sided pain despite the Toradol Dilaudid so I did do a CT scan of her belly to rule out any other Sears intra-abdominal process but this was negative.  With the significant diarrhea and crampy abdominal pain, this is most likely a viral gastroenteritis.  Recommend symptomatic care.  Patient was given some hydrocodone use for pain.  She will do a clear liquid diet and slowly advances tolerated.  I will have her follow up on Monday if there is no improvement in her symptoms.    Assessments & Plan (with Medical Decision Making)  viral gastroenteritis      I have reviewed the nursing notes.    I have reviewed the findings, diagnosis, plan and need for follow up with the patient.      Discharge Medication List as of 6/18/2017  6:19 PM      START taking these medications    Details   HYDROcodone-acetaminophen (NORCO) 5-325 MG per tablet Take 1-2 tablets by mouth every 4 hours as needed for moderate to severe pain, Disp-15 tablet, R-0, Local Print             Final diagnoses:   Viral gastroenteritis     This document serves as a record of services personally performed by Sang Rios MD. It was created on their behalf by Ana Cristina Cui, a trained medical scribe. The creation of this record is based on the provider's personal observations and the statements of the patient. This document has been checked and approved by the attending provider.    Note: Chart documentation done in part with Dragon Voice Recognition software. Although reviewed after completion, some word and grammatical errors may  remain.      6/18/2017   [facility] EMERGENCY DEPARTMENT     Sang Rios MD  06/19/17 0717

## 2017-06-18 NOTE — ED NOTES
"Pt presents with severe diarrhea. Pt states started on Friday and everything she drinks \"goes right thru her.\" Pt mentions abdominal pain has gotten worse. Lower abdominal pain. Taking tums. No vomiting. No recent travel outside the [place].   "

## 2017-06-18 NOTE — ED AVS SNAPSHOT
[facility] Emergency Department    [address] [place] [address]    Phone:  [phone]    Fax:  [phone]                                       Kirstie Stoll   MRN: [id number]    Department:  [facility] Emergency Department   Date of Visit:  6/18/2017           After Visit Summary Signature Page     I have received my discharge instructions, and my questions have been answered. I have discussed any challenges I see with this plan with the nurse or doctor.    ..........................................................................................................................................  Patient/Patient Representative Signature      ..........................................................................................................................................  Patient Representative Print Name and Relationship to Patient    ..................................................               ................................................  Date                                            Time    ..........................................................................................................................................  Reviewed by Signature/Title    ...................................................              ..............................................  Date                                                            Time

## 2017-06-18 NOTE — LETTER
[facility] EMERGENCY DEPARTMENT  [address] [place] [address]  [phone]    2017    Kirstie Stoll  [address] [address]  [phone] (home) none (work)    : 1980      To Whom it may concern:    Kirstie Stoll was seen in our Emergency Department today, 2017.  I expect her condition to improve over the next 2 days.  She may return to work/school when improved.    Sincerely,        Sang Rios

## 2017-07-17 ENCOUNTER — TELEPHONE (OUTPATIENT)
Dept: FAMILY MEDICINE | Facility: OTHER | Age: 37
End: 2017-07-17

## 2017-07-17 NOTE — TELEPHONE ENCOUNTER
Summary:    Patient is due/failing the following:   PAP    Action needed:   Patient needs office visit for PAP.    Type of outreach:    Phone, left message for patient to call back.     Questions for provider review:    None                                                                                                                                    Maira Ramirez       Chart routed to Care Team .        Panel Management Review      Patient has the following on her problem list: None      Composite cancer screening  Chart review shows that this patient is due/due soon for the following Pap Smear

## 2017-07-31 NOTE — TELEPHONE ENCOUNTER
Spoke to patient unable to complete at this time and she will call back when able.    Maira Ramirez

## 2017-11-02 ENCOUNTER — TELEPHONE (OUTPATIENT)
Dept: FAMILY MEDICINE | Facility: OTHER | Age: 37
End: 2017-11-02

## 2017-11-02 NOTE — TELEPHONE ENCOUNTER
Summary:    Patient is due/failing the following:   PAP    Action needed:   Patient needs office visit for PAP.    Type of outreach:    Sent letter.    Questions for provider review:    None                                                                                                                                    Maira Ramirez       Chart routed to Care Team .        Panel Management Review      Patient has the following on her problem list: None      Composite cancer screening  Chart review shows that this patient is due/due soon for the following Pap Smear

## 2017-11-02 NOTE — LETTER
[facility]  [address] [place] [address]  Phone: [phone]  November 2, 2017      Kirstie Stoll  [address] [address]      Dear Kirstie,    We care about your health and have reviewed your health plan including your medical conditions, medications, and lab results.  Based on this review, it is recommended that you follow up regarding the following health topic(s):  -Cervical Cancer Screening    We recommend you take the following action(s):  -schedule a PAP SMEAR EXAM which is due.  Please disregard this reminder if you have had this exam elsewhere within the last year.  It would be helpful for us to have a copy of your recent pap smear report to update your records.     Please call us at the [facility] - [phone] (or use [employer]) to address the above recommendations.     Thank you for trusting [facility] and we appreciate the opportunity to serve you.  We look forward to supporting your healthcare needs in the future.    Healthy Regards,    Your Health Care Team  [facility]

## 2018-01-03 ENCOUNTER — OFFICE VISIT (OUTPATIENT)
Dept: URGENT CARE | Facility: RETAIL CLINIC | Age: 38
End: 2018-01-03
Payer: COMMERCIAL

## 2018-01-03 VITALS
DIASTOLIC BLOOD PRESSURE: 89 MMHG | SYSTOLIC BLOOD PRESSURE: 128 MMHG | OXYGEN SATURATION: 99 % | TEMPERATURE: 98.4 F | HEART RATE: 83 BPM

## 2018-01-03 DIAGNOSIS — J01.90 ACUTE SINUSITIS WITH SYMPTOMS > 10 DAYS: Primary | ICD-10-CM

## 2018-01-03 DIAGNOSIS — J02.9 ACUTE PHARYNGITIS, UNSPECIFIED ETIOLOGY: ICD-10-CM

## 2018-01-03 DIAGNOSIS — J20.9 ACUTE BRONCHITIS WITH SYMPTOMS > 10 DAYS: ICD-10-CM

## 2018-01-03 DIAGNOSIS — R11.2 NON-INTRACTABLE VOMITING WITH NAUSEA, UNSPECIFIED VOMITING TYPE: ICD-10-CM

## 2018-01-03 DIAGNOSIS — R05.9 COUGH: ICD-10-CM

## 2018-01-03 LAB — S PYO AG THROAT QL IA.RAPID: NORMAL

## 2018-01-03 PROCEDURE — 99213 OFFICE O/P EST LOW 20 MIN: CPT | Performed by: PHYSICIAN ASSISTANT

## 2018-01-03 PROCEDURE — 87081 CULTURE SCREEN ONLY: CPT | Performed by: PHYSICIAN ASSISTANT

## 2018-01-03 PROCEDURE — 87880 STREP A ASSAY W/OPTIC: CPT | Mod: QW | Performed by: PHYSICIAN ASSISTANT

## 2018-01-03 RX ORDER — AZITHROMYCIN 250 MG/1
TABLET, FILM COATED ORAL
Qty: 6 TABLET | Refills: 0 | Status: SHIPPED | OUTPATIENT
Start: 2018-01-03 | End: 2018-03-02

## 2018-01-03 RX ORDER — CEPHALEXIN 500 MG/1
500 CAPSULE ORAL 2 TIMES DAILY
Qty: 20 CAPSULE | Refills: 0 | Status: SHIPPED | OUTPATIENT
Start: 2018-01-03 | End: 2018-01-03

## 2018-01-03 NOTE — MR AVS SNAPSHOT
After Visit Summary   1/3/2018    Kirstie Stoll    MRN: [id number]           Patient Information     Date Of Birth          1980        Visit Information        Provider Department      1/3/2018 6:20 PM Moira Ybarra PA-C [facility]        Today's Diagnoses     Acute sinusitis with symptoms > 10 days    -  1    Acute pharyngitis, unspecified etiology        Cough        Acute bronchitis with symptoms > 10 days        Non-intractable vomiting with nausea, unspecified vomiting type          Care Instructions       * PHARYNGITIS (Sore Throat),REPORT PENDING    Pharyngitis (sore throat) is often due to a virus, but can also be caused by the  strep  bacteria. This is called  strep throat . Both viral and strep infection can cause throat pain that is worse when swallowing, aching all over with headache and fever. Both types of infections are contagious. They may be spread by coughing, kissing or touching others after touching your mouth or nose, so wash your hands often.  A test has been done to determine whether or not you have strep throat. If it is positive for strep infection you will usually need to take antibiotics. If the test is negative, you probably have a viral pharyngitis, and antibiotic treatment will not help you recover.  HOME CARE:    If your symptoms are severe, rest at home for the first 2-3 days. If you are told that your test is positive for strep, you should be off work and school for the first two days of antibiotic treatment. After that, you will no longer be as contagious.    Children: Use acetaminophen (Tylenol) for fever, fussiness or discomfort. In infants over six months of age, you may use ibuprofen (Children's Motrin) instead of Tylenol. [NOTE: If your child has chronic liver or kidney disease or ever had a stomach ulcer or GI bleeding, talk with your doctor before using these medicines.]   (Aspirin should never be used in anyone under 18 years of  age who is ill with a fever. It may cause severe liver damage.)  Adults: You may use acetaminophen (Tylenol) 650-1000 mg every 6 hours or ibuprofen (Motrin, Advil) 600 mg every 6-8 hours with food to control pain, if you are able to take these medicines. [NOTE: If you have chronic liver or kidney disease or ever had a stomach ulcer or GI bleeding, talk with your doctor before using these medicines.]    Throat lozenges or sprays (Chloraseptic and others), or gargling with warm salt water will reduce throat pain. Dissolve 1/2 teaspoon of salt in 1 glass of warm water. This is especially useful just before meals.     FOLLOW UP with your doctor as advised by our staff if you are not improving over the next week.  GET PROMPT MEDICAL ATTENTION  if any of the following occur:    Fever over 101 F (38.3 C) for more than three days    New or worsening ear pain, sinus pain or headache    Unable to swallow liquids or open your mouth wide due to throat pain    Trouble breathing    Muffled voice    New rash       8335-7732 [employer]. [address] [place] [address]. All rights reserved. This information is not intended as a substitute for professional medical care. Always follow your healthcare professional's instructions.  This information has been modified by your health care provider with permission from the publisher.          Throat culture pending - will be notified of positive results only.      Please FOLLOW UP at primary care clinic if not improving, new symptoms, worse or this does not resolve.  [facility]  [phone]  [facility]  [phone]  [facility]  [phone]    .......................................Hold all liquids and solids until no vomiting x 1 hour. Then start with small sips of clear liquids - wait 10 minutes and if no vomiting gradually increase amount of fluids every 10 minutes and advance diet as tolerated.    If having diarrhea  "continue offering fluids in small amounts and frequently. Try to eat some yogurt daily (or take a probiotic). Advance diet as tolerated.  (Gatorade or similar -adults)                  Follow-ups after your visit        Who to contact     You can reach your care team any time of the day by calling [phone].  Notification of test results:  If you have an abnormal lab result, we will notify you by phone as soon as possible.         Additional Information About Your Visit        [employer] Information     [employer] lets you send messages to your doctor, view your test results, renew your prescriptions, schedule appointments and more. To sign up, go to [email or web address] . Click on \"Log in\" on the left side of the screen, which will take you to the Welcome page. Then click on \"Sign up Now\" on the right side of the page.     You will be asked to enter the access code listed below, as well as some personal information. Please follow the directions to create your username and password.     Your access code is: [email or web address]  Expires: 4/3/2018  7:17 PM     Your access code will  in 90 days. If you need help or a new code, please call your [place] clinic or [phone].        Care EveryWhere ID     This is your Care EveryWhere ID. This could be used by other organizations to access your [place] medical records  [id number]        Your Vitals Were     Pulse Temperature Pulse Oximetry             83 98.4  F (36.9  C) (Oral) 99%          Blood Pressure from Last 3 Encounters:   18 128/89   17 (!) 125/91   17 128/78    Weight from Last 3 Encounters:   17 166 lb 4 oz (75.4 kg)   17 166 lb 3.2 oz (75.4 kg)   04/15/16 163 lb 12.8 oz (74.3 kg)              We Performed the Following     BETA STREP GROUP A R/O CULTURE     RAPID STREP SCREEN          Today's Medication Changes          These changes are accurate as of: 1/3/18  7:17 PM.  If you have any questions, ask your nurse or doctor.    "            Start taking these medicines.        Dose/Directions    azithromycin 250 MG tablet   Commonly known as:  ZITHROMAX   Used for:  Acute bronchitis with symptoms > 10 days, Acute sinusitis with symptoms > 10 days   Started by:  Moira Ybarra PA-C        Two tablets first day, then one tablet daily for four days.   Quantity:  6 tablet   Refills:  0            Where to get your medicines      Some of these will need a paper prescription and others can be bought over the counter.  Ask your nurse if you have questions.     Bring a paper prescription for each of these medications     azithromycin 250 MG tablet                Primary Care Provider Office Phone # Fax #    Inez Salter [phone]       [address] [facility] [address]        Equal Access to Services     SHANTAL MCCULLOUGH : Radha Whitaker, wadante gill, qabrenna kaalmaelvin martinez, federica jalloh. So [facility] [phone].    ATENCIÓN: Si habla español, tiene a barksdale disposición servicios gratuitos de asistencia lingüística. Llame al [phone].    We comply with applicable federal civil rights laws and [place] laws. We do not discriminate on the basis of race, color, national origin, age, disability, sex, sexual orientation, or gender identity.            Thank you!     Thank you for choosing [facility]  for your care. Our goal is always to provide you with excellent care. Hearing back from our patients is one way we can continue to improve our services. Please take a few minutes to complete the written survey that you may receive in the mail after your visit with us. Thank you!             Your Updated Medication List - Protect others around you: Learn how to safely use, store and throw away your medicines at www.disposemymeds.org.          This list is accurate as of: 1/3/18  7:17 PM.  Always use your most recent med list.                   Brand Name Dispense  Instructions for use Diagnosis    azithromycin 250 MG tablet    ZITHROMAX    6 tablet    Two tablets first day, then one tablet daily for four days.    Acute bronchitis with symptoms > 10 days, Acute sinusitis with symptoms > 10 days       calcium carbonate 500 MG chewable tablet    TUMS     Take 1 chew tab by mouth daily        HYDROcodone-acetaminophen 5-325 MG per tablet    NORCO    15 tablet    Take 1-2 tablets by mouth every 4 hours as needed for moderate to severe pain        IBUPROFEN PO           SUDAFED PO

## 2018-01-03 NOTE — LETTER
[address] [address]        1/3/2018    Kirstie Thacker was seen 1/3/2018 at the [facility]. Please excuse Kirstie from work tomorrow and possibly the next few days  due to illness. Kirstie may return to work when no fever x 1 day and feeling better.      Cordially,        Moira Ybarra, PAC

## 2018-01-04 NOTE — NURSING NOTE
"Chief Complaint   Patient presents with     Cough     x 2 weeks     Pharyngitis       Initial /89  Pulse 83  Temp 98.4  F (36.9  C) (Oral)  SpO2 99% Estimated body mass index is 28.54 kg/(m^2) as calculated from the following:    Height as of 6/18/17: 5' 4\" (1.626 m).    Weight as of 6/18/17: 166 lb 4 oz (75.4 kg).  Medication Reconciliation: complete   Shawanda Medrano      "

## 2018-01-04 NOTE — PATIENT INSTRUCTIONS
* PHARYNGITIS (Sore Throat),REPORT PENDING    Pharyngitis (sore throat) is often due to a virus, but can also be caused by the  strep  bacteria. This is called  strep throat . Both viral and strep infection can cause throat pain that is worse when swallowing, aching all over with headache and fever. Both types of infections are contagious. They may be spread by coughing, kissing or touching others after touching your mouth or nose, so wash your hands often.  A test has been done to determine whether or not you have strep throat. If it is positive for strep infection you will usually need to take antibiotics. If the test is negative, you probably have a viral pharyngitis, and antibiotic treatment will not help you recover.  HOME CARE:    If your symptoms are severe, rest at home for the first 2-3 days. If you are told that your test is positive for strep, you should be off work and school for the first two days of antibiotic treatment. After that, you will no longer be as contagious.    Children: Use acetaminophen (Tylenol) for fever, fussiness or discomfort. In infants over six months of age, you may use ibuprofen (Children's Motrin) instead of Tylenol. [NOTE: If your child has chronic liver or kidney disease or ever had a stomach ulcer or GI bleeding, talk with your doctor before using these medicines.]   (Aspirin should never be used in anyone under 18 years of age who is ill with a fever. It may cause severe liver damage.)  Adults: You may use acetaminophen (Tylenol) 650-1000 mg every 6 hours or ibuprofen (Motrin, Advil) 600 mg every 6-8 hours with food to control pain, if you are able to take these medicines. [NOTE: If you have chronic liver or kidney disease or ever had a stomach ulcer or GI bleeding, talk with your doctor before using these medicines.]    Throat lozenges or sprays (Chloraseptic and others), or gargling with warm salt water will reduce throat pain. Dissolve 1/2 teaspoon of salt in 1 glass of  warm water. This is especially useful just before meals.     FOLLOW UP with your doctor as advised by our staff if you are not improving over the next week.  GET PROMPT MEDICAL ATTENTION  if any of the following occur:    Fever over 101 F (38.3 C) for more than three days    New or worsening ear pain, sinus pain or headache    Unable to swallow liquids or open your mouth wide due to throat pain    Trouble breathing    Muffled voice    New rash       7897-5680 [employer]. [address] [place] [address], [place] [address]. All rights reserved. This information is not intended as a substitute for professional medical care. Always follow your healthcare professional's instructions.  This information has been modified by your health care provider with permission from the publisher.          Throat culture pending - will be notified of positive results only.      Please FOLLOW UP at primary care clinic if not improving, new symptoms, worse or this does not resolve.  [facility]  [phone]  [facility]  [phone]  [facility]  [phone]    .......................................Hold all liquids and solids until no vomiting x 1 hour. Then start with small sips of clear liquids - wait 10 minutes and if no vomiting gradually increase amount of fluids every 10 minutes and advance diet as tolerated.    If having diarrhea continue offering fluids in small amounts and frequently. Try to eat some yogurt daily (or take a probiotic). Advance diet as tolerated.  (Gatorade or similar -adults)

## 2018-01-04 NOTE — PROGRESS NOTES
Chief Complaint   Patient presents with     Cough     x 2 weeks     Pharyngitis         SUBJECTIVE:   Pt. presenting to [facility] -  with a chief complaint of nasal and sinus congestion - some dry cough,  hoarse voice, sinus HA and thick discharge.    V x 1 earlier today. No diarrhea.     .   See CC.  .No SOB or chest pain.   Hx of asthma - none.  Onset of symptoms gradual  Course of illness is worsening.    Severity moderate  Current and Associated symptoms: runny nose, stuffy nose, cough - non-productive, hoarse voice, facial pain/pressure, body aches and fatigue and V x 1 earlier today - has been eating and drinking since  Treatment measures tried include Tylenol/Ibuprofen and OTC Cough med.  Predisposing factors include None.  Last antibiotic > year     Pregnancy no  Smoker no    ROS:  Feverish  Energy level is <  ENT - denies ear pain  CP - see above    GI- - appetite is <. No diarrhea.   No bowel or bladder changes   MSK - no joint pain or swelling   Skin: No rashes  Past Medical History:   Diagnosis Date     Vaginal bleeding, abnormal     ongoing since about 2010     Past Surgical History:   Procedure Laterality Date     DILATION AND CURETTAGE, HYSTEROSCOPY DIAGNOSTIC, COMBINED  11/15/2012    Procedure: COMBINED DILATION AND CURETTAGE, HYSTEROSCOPY DIAGNOSTIC;  Hysteroscopy, D&C, polypectomy;  Surgeon: Joslyn Rosales DO;  Location: [facility] OR     OPEN REDUCTION INTERNAL FIXATION WRIST      right wrist     Patient Active Problem List   Diagnosis     CARDIOVASCULAR SCREENING; LDL GOAL LESS THAN 160     Menometrorrhagia     Anxiety attack     Endometrial polyp     Current Outpatient Prescriptions   Medication     Pseudoephedrine HCl (SUDAFED PO)     IBUPROFEN PO     calcium carbonate (TUMS) 500 MG chewable tablet     HYDROcodone-acetaminophen (NORCO) 5-325 MG per tablet     No current facility-administered medications for this visit.          OBJECTIVE:  /89  Pulse 83  Temp 98.4  F  (36.9  C) (Oral)  SpO2 99%    GENERAL APPEARANCE: cooperative, alert and no distress. Appears well hydrated.  EYES: conjunctiva clear  HENT: Rt ear canal  clear and TM normal   Lt ear canal clear and TM normal   Nose marked congestion. Thick discharge  Mouth without ulcers or lesions. mod erythema. no exudate.   NECK: supple, few small shoddy NT ant nodes. No  posterior nodes.  RESP: lungs  - no rales or wheezes but few scattered ronchi. Breathing easily.  CV: regular rates and rhythm  ABDOMEN:  soft, nontender, no HSM or masses and bowel sounds normal   SKIN: no suspicious lesions or rashes  some tenderness to palpate over frontal sinus areas.    Rapid strep neg    ASSESSMENT:     Acute pharyngitis, unspecified etiology  Cough  Acute sinusitis with symptoms > 10 days  Acute bronchitis with symptoms > 10 days  Non-intractable vomiting with nausea, unspecified vomiting type      PLAN:  Symptomatic measures   Prescriptions as below. Discussed indications, dosing, side affects and adverse reactions of medications with patient -Z pack (no history of arrhythmias)  Eat yogurt daily or take a probiotic supplement when on antibiotics.  OTC cough suppressant/expectorant discussed  Salt water gargles - throat lozenges or honey/lemon tea if soothing   Throat culture pending - will be notified of positive results only.  saline nasal spray for  nasal congestion   Cool mist vaporizer.   Stay in clean air environment.  > rest.  > fluids.  Contagiousness and hygiene discussed.  Fever and pain  control measures discussed.  If unable to swallow or any breathing difficulty to go to ED C.cleav  Patient Instructions      * PHARYNGITIS (Sore Throat),REPORT PENDING    Pharyngitis (sore throat) is often due to a virus, but can also be caused by the  strep  bacteria. This is called  strep throat . Both viral and strep infection can cause throat pain that is worse when swallowing, aching all over with headache and fever. Both types of  infections are contagious. They may be spread by coughing, kissing or touching others after touching your mouth or nose, so wash your hands often.  A test has been done to determine whether or not you have strep throat. If it is positive for strep infection you will usually need to take antibiotics. If the test is negative, you probably have a viral pharyngitis, and antibiotic treatment will not help you recover.  HOME CARE:    If your symptoms are severe, rest at home for the first 2-3 days. If you are told that your test is positive for strep, you should be off work and school for the first two days of antibiotic treatment. After that, you will no longer be as contagious.    Children: Use acetaminophen (Tylenol) for fever, fussiness or discomfort. In infants over six months of age, you may use ibuprofen (Children's Motrin) instead of Tylenol. [NOTE: If your child has chronic liver or kidney disease or ever had a stomach ulcer or GI bleeding, talk with your doctor before using these medicines.]   (Aspirin should never be used in anyone under 18 years of age who is ill with a fever. It may cause severe liver damage.)  Adults: You may use acetaminophen (Tylenol) 650-1000 mg every 6 hours or ibuprofen (Motrin, Advil) 600 mg every 6-8 hours with food to control pain, if you are able to take these medicines. [NOTE: If you have chronic liver or kidney disease or ever had a stomach ulcer or GI bleeding, talk with your doctor before using these medicines.]    Throat lozenges or sprays (Chloraseptic and others), or gargling with warm salt water will reduce throat pain. Dissolve 1/2 teaspoon of salt in 1 glass of warm water. This is especially useful just before meals.     FOLLOW UP with your doctor as advised by our staff if you are not improving over the next week.  GET PROMPT MEDICAL ATTENTION  if any of the following occur:    Fever over 101 F (38.3 C) for more than three days    New or worsening ear pain, sinus pain or  headache    Unable to swallow liquids or open your mouth wide due to throat pain    Trouble breathing    Muffled voice    New rash       [id number] [employer]. [address] [place] [address], [place] [address]. All rights reserved. This information is not intended as a substitute for professional medical care. Always follow your healthcare professional's instructions.  This information has been modified by your health care provider with permission from the publisher.          Throat culture pending - will be notified of positive results only.      Please FOLLOW UP at primary care clinic if not improving, new symptoms, worse or this does not resolve.  [facility]  [phone]  [facility]  [phone]  [facility]  [phone]    .......................................Hold all liquids and solids until no vomiting x 1 hour. Then start with small sips of clear liquids - wait 10 minutes and if no vomiting gradually increase amount of fluids every 10 minutes and advance diet as tolerated.    If having diarrhea continue offering fluids in small amounts and frequently. Try to eat some yogurt daily (or take a probiotic). Advance diet as tolerated.  (Gatorade or similar -adults)            See letter for work  Pt is comfortable with this plan.  Electronically signed,  KRUPA Ybarra, PAC

## 2018-01-05 LAB — BETA STREP CONFIRM: NORMAL

## 2018-02-19 ENCOUNTER — TELEPHONE (OUTPATIENT)
Dept: FAMILY MEDICINE | Facility: OTHER | Age: 38
End: 2018-02-19

## 2018-02-19 NOTE — TELEPHONE ENCOUNTER
Summary:    Patient is due/failing the following:   Establish care with new PCP , PAP and PHYSICAL    Action needed:   Patient needs office visit for PAP/PE.    Type of outreach:    Phone, left message for patient to call back.     Questions for provider review:    None                                                                                                                                    Maira Ramirez     Chart routed to Care Team .      Panel Management Review      Patient has the following on her problem list: None      Composite cancer screening  Chart review shows that this patient is due/due soon for the following Pap Smear

## 2018-03-02 ENCOUNTER — OFFICE VISIT (OUTPATIENT)
Dept: FAMILY MEDICINE | Facility: CLINIC | Age: 38
End: 2018-03-02
Payer: COMMERCIAL

## 2018-03-02 VITALS
BODY MASS INDEX: 29.09 KG/M2 | HEART RATE: 100 BPM | WEIGHT: 170.4 LBS | TEMPERATURE: 97.5 F | DIASTOLIC BLOOD PRESSURE: 80 MMHG | HEIGHT: 64 IN | OXYGEN SATURATION: 100 % | SYSTOLIC BLOOD PRESSURE: 126 MMHG

## 2018-03-02 DIAGNOSIS — Z00.00 ROUTINE GENERAL MEDICAL EXAMINATION AT A HEALTH CARE FACILITY: Primary | ICD-10-CM

## 2018-03-02 PROCEDURE — 87624 HPV HI-RISK TYP POOLED RSLT: CPT | Performed by: NURSE PRACTITIONER

## 2018-03-02 PROCEDURE — G0145 SCR C/V CYTO,THINLAYER,RESCR: HCPCS | Performed by: NURSE PRACTITIONER

## 2018-03-02 PROCEDURE — 99395 PREV VISIT EST AGE 18-39: CPT | Performed by: NURSE PRACTITIONER

## 2018-03-02 NOTE — LETTER
March 12, 2018    Kirstie Stoll  [address] [address] [id number]    Dear Kirstie,  We are happy to inform you that your PAP smear result from 3/2/18 is normal.  We are now able to do a follow up test on PAP smears. The DNA test is for HPV (Human Papilloma Virus). Cervical cancer is closely linked with certain types of HPV. Your result showed no evidence of high risk HPV.  Therefore we recommend you return in 5 years for your next pap smear and HPV test.  You will still need to return to the clinic every year for an annual exam and other preventive tests.  Please contact the clinic at [phone] with any questions.  Sincerely,    JUAN Verduzco CNP/rlm

## 2018-03-02 NOTE — PROGRESS NOTES
Answers for HPI/ROS submitted by the patient on 3/2/2018   Annual Exam:  Getting at least 3 servings of Calcium per day:: Yes  Bi-annual eye exam:: NO  Dental care twice a year:: NO  Sleep apnea or symptoms of sleep apnea:: None  Diet:: Low fat/cholesterol, Vegetarian/vegan  Frequency of exercise:: 2-3 days/week  Taking medications regularly:: Yes  Additional concerns today:: YES  PHQ-2 Score: 0  Duration of exercise:: 45-60 minutes

## 2018-03-02 NOTE — MR AVS SNAPSHOT
After Visit Summary   3/2/2018    Kirstie Stoll    MRN: [id number]           Patient Information     Date Of Birth          1980        Visit Information        Provider Department      3/2/2018 3:45 PM Elena Prince APRN [facility]        Today's Diagnoses     Routine general medical examination at a health care facility    -  1      Care Instructions      Preventive Health Recommendations  Female Ages 26 - 39  Yearly exam:   See your health care provider every year in order to    Review health changes.     Discuss preventive care.      Review your medicines if you your doctor has prescribed any.    Until age 30: Get a Pap test every three years (more often if you have had an abnormal result).    After age 30: Talk to your doctor about whether you should have a Pap test every 3 years or have a Pap test with HPV screening every 5 years.   You do not need a Pap test if your uterus was removed (hysterectomy) and you have not had cancer.  You should be tested each year for STDs (sexually transmitted diseases), if you're at risk.   Talk to your provider about how often to have your cholesterol checked.  If you are at risk for diabetes, you should have a diabetes test (fasting glucose).  Shots: Get a flu shot each year. Get a tetanus shot every 10 years.   Nutrition:     Eat at least 5 servings of fruits and vegetables each day.    Eat whole-grain bread, whole-wheat pasta and brown rice instead of white grains and rice.    Talk to your provider about Calcium and Vitamin D.     Lifestyle    Exercise at least 150 minutes a week (30 minutes a day, 5 days of the week). This will help you control your weight and prevent disease.    Limit alcohol to one drink per day.    No smoking.     Wear sunscreen to prevent skin cancer.    See your dentist every six months for an exam and cleaning.            Follow-ups after your visit        Who to contact     If you have questions or need  "follow up information about today's clinic visit or your schedule please contact [facility] directly at [phone].  Normal or non-critical lab and imaging results will be communicated to you by Reimagehart, letter or phone within 4 business days after the clinic has received the results. If you do not hear from us within 7 days, please contact the clinic through Reimagehart or phone. If you have a critical or abnormal lab result, we will notify you by phone as soon as possible.  Submit refill requests through [employer]Wire or call your pharmacy and they will forward the refill request to us. Please allow 3 business days for your refill to be completed.          Additional Information About Your Visit        ReimageharSpeechCycle Information     ShoutWire lets you send messages to your doctor, view your test results, renew your prescriptions, schedule appointments and more. To sign up, go to [email or web address] . Click on \"Log in\" on the left side of the screen, which will take you to the Welcome page. Then click on \"Sign up Now\" on the right side of the page.     You will be asked to enter the access code listed below, as well as some personal information. Please follow the directions to create your username and password.     Your access code is: [email or web address]  Expires: 4/3/2018  7:17 PM     Your access code will  in 90 days. If you need help or a new code, please call your [place] clinic or [phone].        Care EveryWhere ID     This is your Care EveryWhere ID. This could be used by other organizations to access your [place] medical records  [id number]        Your Vitals Were     Pulse Temperature Height Last Period Pulse Oximetry BMI (Body Mass Index)    100 97.5  F (36.4  C) (Temporal) 5' 3.5\" (1.613 m) 2018 100% 29.71 kg/m2       Blood Pressure from Last 3 Encounters:   18 126/80   18 128/89   17 (!) 125/91    Weight from Last 3 Encounters:   18 170 lb 6.4 oz (77.3 kg) "   06/18/17 166 lb 4 oz (75.4 kg)   02/20/17 166 lb 3.2 oz (75.4 kg)              We Performed the Following     HPV High Risk Types DNA Cervical     Pap imaged thin layer screen with HPV - recommended age 30 - 65 years (select HPV order below)        Primary Care Provider Office Phone # Fax #    JUAN Verduzco SHIRAZ [phone] [address] [place] [address]        Equal Access to Services     SHANTAL MCCULLOUGH : Hadii aad ku hadasho Soomaali, waaxda luqadaha, qaybta kaalmada adeegyada, waxay idiin hayaan adeeg kharash la'scarlett . So [facility] [phone].    ATENCIÓN: Si habla español, tiene a barksdale disposición servicios gratuitos de asistencia lingüística. [facility] [phone].    We comply with applicable federal civil rights laws and [place] laws. We do not discriminate on the basis of race, color, national origin, age, disability, sex, sexual orientation, or gender identity.            Thank you!     Thank you for choosing [facility]  for your care. Our goal is always to provide you with excellent care. Hearing back from our patients is one way we can continue to improve our services. Please take a few minutes to complete the written survey that you may receive in the mail after your visit with us. Thank you!             Your Updated Medication List - Protect others around you: Learn how to safely use, store and throw away your medicines at www.disposemymeds.org.      Notice  As of 3/2/2018  4:14 PM    You have not been prescribed any medications.

## 2018-03-02 NOTE — PROGRESS NOTES
SUBJECTIVE:   CC: Kirstie Stoll is an 37 year old woman who presents for preventive health visit.     Physical   Annual:     Getting at least 3 servings of Calcium per day::  Yes    Bi-annual eye exam::  NO    Dental care twice a year::  NO    Sleep apnea or symptoms of sleep apnea::  None    Diet::  Low fat/cholesterol and Vegetarian/vegan    Frequency of exercise::  2-3 days/week    Duration of exercise::  45-60 minutes    Taking medications regularly::  Yes    Additional concerns today::  YES          cramping a lot, regular periods      Today's PHQ-2 Score:   PHQ-2 ( 1999 [employer]) 3/2/2018   Q1: Little interest or pleasure in doing things 0   Q2: Feeling down, depressed or hopeless 0   PHQ-2 Score 0   Q1: Little interest or pleasure in doing things Not at all   Q2: Feeling down, depressed or hopeless Not at all   PHQ-2 Score 0       Abuse: Current or Past(Physical, Sexual or Emotional)- No  Do you feel safe in your environment - Yes    Social History   Substance Use Topics     Smoking status: Former Smoker     Types: Cigarettes     Quit date: 5/9/2013     Smokeless tobacco: Never Used     Alcohol use 3.0 oz/week     6 Cans of beer per week     Alcohol Use 3/2/2018   AUDIT SCORE  9       Reviewed orders with patient.  Reviewed health maintenance and updated orders accordingly - Yes  BP Readings from Last 3 Encounters:   03/02/18 126/80   01/03/18 128/89   06/18/17 (!) 125/91    Wt Readings from Last 3 Encounters:   03/02/18 170 lb 6.4 oz (77.3 kg)   06/18/17 166 lb 4 oz (75.4 kg)   02/20/17 166 lb 3.2 oz (75.4 kg)                    Mammogram not appropriate for this patient based on age.    Pertinent mammograms are reviewed under the imaging tab.  History of abnormal Pap smear: NO - age 30-65 PAP every 5 years with negative HPV co-testing recommended    Reviewed and updated as needed this visit by clinical staff  Tobacco  Allergies  Meds  Med Hx  Fam Hx         Reviewed and updated as needed this visit by  "Provider        Past Medical History:   Diagnosis Date     Vaginal bleeding, abnormal     ongoing since about       Past Surgical History:   Procedure Laterality Date     DILATION AND CURETTAGE, HYSTEROSCOPY DIAGNOSTIC, COMBINED  11/15/2012    Procedure: COMBINED DILATION AND CURETTAGE, HYSTEROSCOPY DIAGNOSTIC;  Hysteroscopy, D&C, polypectomy;  Surgeon: Joslyn Rosales DO;  Location: [facility] OR     OPEN REDUCTION INTERNAL FIXATION WRIST      right wrist     Obstetric History       T0      L3     SAB0   TAB0   Ectopic0   Multiple0   Live Births0       # Outcome Date GA Lbr Ildefonso/2nd Weight Sex Delivery Anes PTL Lv   3     6 lb 9 oz (2.977 kg) F       2     8 lb 13 oz (3.997 kg) M       1     7 lb 11 oz (3.487 kg) F              Review of Systems  C: NEGATIVE for fever, chills, change in weight  I: NEGATIVE for worrisome rashes, moles or lesions  E: NEGATIVE for vision changes or irritation  ENT: NEGATIVE for ear, mouth and throat problems  R: NEGATIVE for significant cough or SOB  B: NEGATIVE for masses, tenderness or discharge  CV: NEGATIVE for chest pain, palpitations or peripheral edema  GI: NEGATIVE for nausea, abdominal pain, heartburn, or change in bowel habits  : NEGATIVE for unusual urinary or vaginal symptoms. Periods are regular.  M: NEGATIVE for significant arthralgias or myalgia  N: NEGATIVE for weakness, dizziness or paresthesias  E: NEGATIVE for temperature intolerance, skin/hair changes  P: NEGATIVE for changes in mood or affect     OBJECTIVE:   /80  Pulse 100  Temp 97.5  F (36.4  C) (Temporal)  Ht 5' 3.5\" (1.613 m)  Wt 170 lb 6.4 oz (77.3 kg)  LMP 2018  SpO2 100%  BMI 29.71 kg/m2  Physical Exam  GENERAL: healthy, alert and no distress  EYES: Eyes grossly normal to inspection, PERRL and conjunctivae and sclerae normal  HENT: ear canals and TM's normal, nose and mouth without ulcers or lesions  NECK: no adenopathy, no asymmetry, " "masses, or scars and thyroid normal to palpation  RESP: lungs clear to auscultation - no rales, rhonchi or wheezes  BREAST: normal without masses, tenderness or nipple discharge and no palpable axillary masses or adenopathy  CV: regular rate and rhythm, normal S1 S2, no S3 or S4, no murmur, click or rub, no peripheral edema and peripheral pulses strong  ABDOMEN: soft, nontender, no hepatosplenomegaly, no masses and bowel sounds normal   (female): normal female external genitalia, normal urethral meatus, vaginal mucosa pink, moist, well rugated, and normal cervix/adnexa/uterus without masses or discharge  MS: no gross musculoskeletal defects noted, no edema  SKIN: no suspicious lesions or rashes  NEURO: Normal strength and tone, mentation intact and speech normal  PSYCH: mentation appears normal, affect normal/bright    ASSESSMENT/PLAN:       ICD-10-CM    1. Routine general medical examination at a health care facility Z00.00 Pap imaged thin layer screen with HPV - recommended age 30 - 65 years (select HPV order below)     HPV High Risk Types DNA Cervical       COUNSELING:  Reviewed preventive health counseling, as reflected in patient instructions       Regular exercise       Healthy diet/nutrition       Vision screening       Osteoporosis Prevention/Bone Health         reports that she quit smoking about 4 years ago. Her smoking use included Cigarettes. She has never used smokeless tobacco.    Estimated body mass index is 29.71 kg/(m^2) as calculated from the following:    Height as of this encounter: 5' 3.5\" (1.613 m).    Weight as of this encounter: 170 lb 6.4 oz (77.3 kg).   Weight management plan: Discussed healthy diet and exercise guidelines and patient will follow up in 12 months in clinic to re-evaluate.    Counseling Resources:  ATP IV Guidelines  Pooled Cohorts Equation Calculator  Breast Cancer Risk Calculator  FRAX Risk Assessment  ICSI Preventive Guidelines  Dietary Guidelines for Americans, " 2010  USDA's MyPlate  ASA Prophylaxis  Lung CA Screening    JUAN Verduzco CNP  [facility]

## 2018-03-06 LAB
COPATH REPORT: NORMAL
PAP: NORMAL

## 2018-03-07 LAB
FINAL DIAGNOSIS: NORMAL
HPV HR 12 DNA CVX QL NAA+PROBE: NEGATIVE
HPV16 DNA SPEC QL NAA+PROBE: NEGATIVE
HPV18 DNA SPEC QL NAA+PROBE: NEGATIVE
SPECIMEN DESCRIPTION: NORMAL
SPECIMEN SOURCE CVX/VAG CYTO: NORMAL

## 2018-11-19 ENCOUNTER — OFFICE VISIT (OUTPATIENT)
Dept: URGENT CARE | Facility: RETAIL CLINIC | Age: 38
End: 2018-11-19

## 2018-11-19 VITALS
DIASTOLIC BLOOD PRESSURE: 83 MMHG | HEART RATE: 90 BPM | OXYGEN SATURATION: 99 % | SYSTOLIC BLOOD PRESSURE: 122 MMHG | TEMPERATURE: 97.9 F

## 2018-11-19 DIAGNOSIS — Z53.9 ERRONEOUS ENCOUNTER--DISREGARD: Primary | ICD-10-CM

## 2018-11-19 NOTE — MR AVS SNAPSHOT
"              After Visit Summary   2018    Kirstie Stoll    MRN: [id number]           Patient Information     Date Of Birth          1980        Visit Information        Provider Department      2018 6:20 PM Maddy Tang PA-C [facility] [place]        Today's Diagnoses     ERRONEOUS ENCOUNTER--DISREGARD    -  1       Follow-ups after your visit        Who to contact     You can reach your care team any time of the day by calling [phone].  Notification of test results:  If you have an abnormal lab result, we will notify you by phone as soon as possible.         Additional Information About Your Visit        MyChart Information     [facility] lets you send messages to your doctor, view your test results, renew your prescriptions, schedule appointments and more. To sign up, go to [email or web address] . Click on \"Log in\" on the left side of the screen, which will take you to the Welcome page. Then click on \"Sign up Now\" on the right side of the page.     You will be asked to enter the access code listed below, as well as some personal information. Please follow the directions to create your username and password.     Your access code is: [email or web address]  Expires: 2019  8:12 PM     Your access code will  in 90 days. If you need help or a new code, please call your [place] clinic or [phone].        Care EveryWhere ID     This is your [facility] EveryWhere ID. This could be used by other organizations to access your [place] medical records  [id number]        Your Vitals Were     Pulse Temperature Pulse Oximetry             90 97.9  F (36.6  C) (Oral) 99%          Blood Pressure from Last 3 Encounters:   18 122/83   18 126/80   18 128/89    Weight from Last 3 Encounters:   18 170 lb 6.4 oz (77.3 kg)   17 166 lb 4 oz (75.4 kg)   17 166 lb 3.2 oz (75.4 kg)              Today, you had the following     No orders found for display       " Primary Care Provider Fax #    Physician No Ref-Primary [phone]       No address on file        Equal Access to Services     SHANTAL MCCULLOUGH : Hadii aad ku hadallankenyon Sherman, kayleeda lorrietalonha, layla casasda heydimaurice, federica suziin hayaajailyn solizlorraine arenas laDhruvscarlett jalloh. [facility] [phone].    ATENCIÓN: Si habla español, tiene a barksdale disposición servicios gratuitos de asistencia lingüística. Llame al [phone].    We comply with applicable federal civil rights laws and [place] laws. We do not discriminate on the basis of race, color, national origin, age, disability, sex, sexual orientation, or gender identity.            Thank you!     Thank you for choosing [facility]  for your care. Our goal is always to provide you with excellent care. Hearing back from our patients is one way we can continue to improve our services. Please take a few minutes to complete the written survey that you may receive in the mail after your visit with us. Thank you!             Your Updated Medication List - Protect others around you: Learn how to safely use, store and throw away your medicines at www.disposemymeds.org.          This list is accurate as of 11/19/18  8:12 PM.  Always use your most recent med list.                   Brand Name Dispense Instructions for use Diagnosis    IBUPROFEN PO

## 2018-11-20 NOTE — PROGRESS NOTES
Chief Complaint   Patient presents with     Headache     has been ill for over 1 week     Dizziness     1 week     Chest hurts     1 week; no cough      SUBJECTIVE:   Kirstie Stoll is a 38 year old female presenting with a chief complaint of headache, dizziness and chest hurts x 1 week  Course of illness is same.    Severity moderate  Current and Associated symptoms: headache, dizzy, chest hurts and diarrhea, getting over the flu.   Worried about bronchitis, but no cough.   Treatment measures tried include  Ibuprofen  Rates headache 7-8/10    Past Medical History:   Diagnosis Date     Vaginal bleeding, abnormal     ongoing since about 2010     Current Outpatient Prescriptions   Medication Sig Dispense Refill     IBUPROFEN PO        Social History   Substance Use Topics     Smoking status: Former Smoker     Types: Cigarettes     Quit date: 5/9/2013     Smokeless tobacco: Never Used     Alcohol use 3.0 oz/week     6 Cans of beer per week       ROS:  CONSTITUTIONAL:POSITIVE for headache NEGATIVE for fever, chills  ENT/MOUTH: NEGATIVE for ear, mouth and throat problems  RESP:POSITIVE for chest hurts and NEGATIVE for cough, sputum or wheezing    OBJECTIVE:  /83 (BP Location: Left arm)  Pulse 90  Temp 97.9  F (36.6  C) (Oral)  SpO2 99%  GENERAL APPEARANCE: healthy, alert and no distress  EYES:  conjunctiva clear  HENT: ear canals and TM's normal.  Nose and mouth without ulcers, erythema or lesions  NECK: supple, nontender, no lymphadenopathy  RESP: lungs clear to auscultation - no rales, rhonchi or wheezes  CV: regular rates and rhythm, normal S1 S2, no murmur noted  SKIN: no suspicious lesions or rashes    Advised no bronchitis from exam, unable to evaluate headaches at [facility]. If persistent, Need to be seen at PCP office for full assessment.     Maddy Tang PA-C  [facility]

## 2019-01-07 ENCOUNTER — TELEPHONE (OUTPATIENT)
Dept: FAMILY MEDICINE | Facility: OTHER | Age: 39
End: 2019-01-07

## 2019-01-07 NOTE — PROGRESS NOTES
SUBJECTIVE:   Kirstie Stoll is a 38 year old female who presents to clinic today for the following health issues:    HPI  Concern - jaw pain  Onset: 2 weeks ago    Description:   Painful, clicking    Intensity: severe    Progression of Symptoms:  worsening    Accompanying Signs & Symptoms:  Headache, left ear pain    Previous history of similar problem:   Yes but now as bad    Precipitating factors:   Worsened by:     Alleviating factors:  Improved by: none  Therapies Tried and outcome: ibuprofen    Patient presents today for evaluation of left sided jaw pain. She reports symptoms started 2 weeks ago. She reports increased pain, clicking and swelling on the left TMJ. She states the pain radiates to her left ear and left temple. She has had a headache due to the pain. Hurts to open mouth and chew. Has been seen by the dentist before and was told she has TMJ. Has never really had symptoms/pain. Has been taking Ibuprofen with little relief.     Problem list and histories reviewed & adjusted, as indicated.  Additional history: as documented    Patient Active Problem List   Diagnosis     CARDIOVASCULAR SCREENING; LDL GOAL LESS THAN 160     Menometrorrhagia     Anxiety attack     Endometrial polyp     Past Surgical History:   Procedure Laterality Date     DILATION AND CURETTAGE, HYSTEROSCOPY DIAGNOSTIC, COMBINED  11/15/2012    Procedure: COMBINED DILATION AND CURETTAGE, HYSTEROSCOPY DIAGNOSTIC;  Hysteroscopy, D&C, polypectomy;  Surgeon: Joslyn Rosales DO;  Location: [facility] OR     OPEN REDUCTION INTERNAL FIXATION WRIST      right wrist       Social History     Tobacco Use     Smoking status: Former Smoker     Types: Cigarettes     Last attempt to quit: 2013     Years since quittin.6     Smokeless tobacco: Never Used   Substance Use Topics     Alcohol use: Yes     Alcohol/week: 3.0 oz     Types: 6 Cans of beer per week     Family History   Problem Relation Age of Onset     Gynecology Mother         early  "menopause     Cardiovascular Father         anerysm     Gynecology Sister         early menopause     Osteoporosis Sister          Current Outpatient Medications   Medication Sig Dispense Refill     IBUPROFEN PO        predniSONE (DELTASONE) 20 MG tablet Take 40 mg by mouth daily for 5 days. 10 tablet 0     Allergies   Allergen Reactions     Amoxicillin Swelling     BP Readings from Last 3 Encounters:   01/08/19 (P) 124/74   11/19/18 122/83   03/02/18 126/80    Wt Readings from Last 3 Encounters:   01/08/19 (P) 83.5 kg (184 lb)   03/02/18 77.3 kg (170 lb 6.4 oz)   06/18/17 75.4 kg (166 lb 4 oz)        ROS:  Constitutional, HEENT, cardiovascular, pulmonary, gi and gu systems are negative, except as otherwise noted.    OBJECTIVE:     BP (P) 124/74   Pulse (P) 70   Temp (P) 97.3  F (36.3  C) (Temporal)   Resp (P) 16   Ht (P) 1.613 m (5' 3.5\")   Wt (P) 83.5 kg (184 lb)   LMP 01/04/2019 (Exact Date)   BMI (P) 32.08 kg/m    Body mass index is 32.08 kg/m  (pended).  GENERAL: healthy, alert and no distress  EYES: Eyes grossly normal to inspection, PERRL and conjunctivae and sclerae normal  HENT: normal cephalic/atraumatic, ear canals and TM's normal, nose and mouth without ulcers or lesions, oropharynx clear, oral mucous membranes moist and tenderness with clicking and mild swelling over left TMJ  NECK: no adenopathy, no asymmetry, masses, or scars and thyroid normal to palpation  RESP: lungs clear to auscultation - no rales, rhonchi or wheezes  CV: regular rate and rhythm, normal S1 S2, no S3 or S4, no murmur, click or rub, no peripheral edema and peripheral pulses strong  SKIN: no suspicious lesions or rashes    Diagnostic Test Results:  none     ASSESSMENT/PLAN:     1. TMJ (temporomandibular joint syndrome)  Patient presents today with left sided TMJ pain and swelling. She has known TMJ but this has not caused pain. She has been taking Ibuprofen with little improvement. Will start short course of Prednisone. " Recommended ice and gentle massage. Discussed considering chiropractic care. Follow-up with dentist if symptoms persist as may need night splint.   - predniSONE (DELTASONE) 20 MG tablet; Take 40 mg by mouth daily for 5 days.  Dispense: 10 tablet; Refill: 0    The patient indicates understanding of these issues and agrees with the plan.    Maddy Davison PA-C  [facility]

## 2019-01-07 NOTE — TELEPHONE ENCOUNTER
Kirstie Stoll is a 38 year old female who calls with facial swelling and jaw pain.    NURSING ASSESSMENT:  Description:  I spoke with the pt who states her jaw has been clicking more then usual lately. Causing HA. Face is now starting to swell some. Swelling around the left jaw line and now the right side some  Onset/duration:  1-2 weeks, last week worse.   Precip. factors:  TMJ  Pain scale (0-10)   7/10 after eating, taking ibuprofen which helps. Pain is better when not chewing    Allergies:   Allergies   Allergen Reactions     Amoxicillin Swelling     RECOMMENDED DISPOSITION:  See in 24 hours  Will comply with recommendation: Yes-offered earlier appts and other locations but pt declined and requested [facility] only and at this time.     Next 5 appointments (look out 90 days)    Jan 08, 2019 10:20 AM CST  Office Visit with Maddy Davison PA-C  [facility] [address] [address] [facility] [address] [phone]        If further questions/concerns or if symptoms do not improve, worsen or new symptoms develop, call your PCP or [facility] as soon as possible.      Guideline used: jaw pain  Telephone Triage Protocols for Nurses, Fifth Edition, Jayla Agosto RN

## 2019-01-08 ENCOUNTER — OFFICE VISIT (OUTPATIENT)
Dept: FAMILY MEDICINE | Facility: OTHER | Age: 39
End: 2019-01-08
Payer: COMMERCIAL

## 2019-01-08 DIAGNOSIS — M26.609 TMJ (TEMPOROMANDIBULAR JOINT SYNDROME): Primary | ICD-10-CM

## 2019-01-08 PROCEDURE — 99213 OFFICE O/P EST LOW 20 MIN: CPT | Performed by: PHYSICIAN ASSISTANT

## 2019-01-08 RX ORDER — PREDNISONE 20 MG/1
40 TABLET ORAL DAILY
Qty: 10 TABLET | Refills: 0 | Status: SHIPPED | OUTPATIENT
Start: 2019-01-08 | End: 2019-01-13

## 2019-01-08 ASSESSMENT — PAIN SCALES - GENERAL: PAINLEVEL: EXTREME PAIN (8)

## 2019-01-08 ASSESSMENT — MIFFLIN-ST. JEOR: SCORE: 1496.68

## 2019-01-08 NOTE — PATIENT INSTRUCTIONS
Prednisone - take 2 tabs once daily for 5 days   Take this in the morning    Ice/heat and gentle massage over the area    Consider chiropractic    Dentist if not improving or happens again in the future

## 2019-02-04 ENCOUNTER — APPOINTMENT (OUTPATIENT)
Dept: GENERAL RADIOLOGY | Facility: CLINIC | Age: 39
End: 2019-02-04
Payer: COMMERCIAL

## 2019-02-04 ENCOUNTER — HOSPITAL ENCOUNTER (EMERGENCY)
Facility: CLINIC | Age: 39
Discharge: HOME | End: 2019-02-04
Attending: NURSE PRACTITIONER | Admitting: NURSE PRACTITIONER
Payer: COMMERCIAL

## 2019-02-04 VITALS
DIASTOLIC BLOOD PRESSURE: 95 MMHG | HEART RATE: 89 BPM | OXYGEN SATURATION: 100 % | BODY MASS INDEX: 31.39 KG/M2 | WEIGHT: 180 LBS | TEMPERATURE: 98.1 F | SYSTOLIC BLOOD PRESSURE: 140 MMHG | RESPIRATION RATE: 18 BRPM

## 2019-02-04 DIAGNOSIS — S60.221A CONTUSION OF RIGHT HAND, INITIAL ENCOUNTER: ICD-10-CM

## 2019-02-04 PROCEDURE — 99282 EMERGENCY DEPT VISIT SF MDM: CPT | Mod: Z6 | Performed by: NURSE PRACTITIONER

## 2019-02-04 PROCEDURE — 99283 EMERGENCY DEPT VISIT LOW MDM: CPT | Performed by: NURSE PRACTITIONER

## 2019-02-04 PROCEDURE — 73130 X-RAY EXAM OF HAND: CPT | Mod: TC,RT

## 2019-02-04 NOTE — ED AVS SNAPSHOT
[facility]  [address] [place] [address]  Phone:  [phone]  Fax:  [phone]                                    Kirstie Stoll   MRN: [id number]    Department:  [facility]   Date of Visit:  2/4/2019           After Visit Summary Signature Page    I have received my discharge instructions, and my questions have been answered. I have discussed any challenges I see with this plan with the nurse or doctor.    ..........................................................................................................................................  Patient/Patient Representative Signature      ..........................................................................................................................................  Patient Representative Print Name and Relationship to Patient    ..................................................               ................................................  Date                                   Time    ..........................................................................................................................................  Reviewed by Signature/Title    ...................................................              ..............................................  Date                                               Time          22EPIC Rev 08/18

## 2019-02-05 NOTE — ED PROVIDER NOTES
History     Chief Complaint   Patient presents with     Fall     Hand Injury     The history is provided by the patient.     Kirstie Stoll is a 38 year old female who presents to the emergency department for fall and hand injury. Patient injured her right hand while walking home from work today. She fell on the ice and landed on her right hand. She states it is painful to bend. She took some Ibuprofen around 1630. She is right handed. Patient states she has a history of shattering her right wrist in the past.    Allergies:  Allergies   Allergen Reactions     Amoxicillin Swelling       Problem List:    Patient Active Problem List    Diagnosis Date Noted     Endometrial polyp 11/15/2012     Priority: Medium     Anxiety attack 10/19/2012     Priority: Medium     CARDIOVASCULAR SCREENING; LDL GOAL LESS THAN 160 10/11/2012     Priority: Medium     Menometrorrhagia 10/11/2012     Priority: Medium        Past Medical History:    Past Medical History:   Diagnosis Date     Vaginal bleeding, abnormal        Past Surgical History:    Past Surgical History:   Procedure Laterality Date     DILATION AND CURETTAGE, HYSTEROSCOPY DIAGNOSTIC, COMBINED  11/15/2012    Procedure: COMBINED DILATION AND CURETTAGE, HYSTEROSCOPY DIAGNOSTIC;  Hysteroscopy, D&C, polypectomy;  Surgeon: Joslyn Rosales DO;  Location: [facility] OR     OPEN REDUCTION INTERNAL FIXATION WRIST      right wrist       Family History:    Family History   Problem Relation Age of Onset     Gynecology Mother         early menopause     Cardiovascular Father         anerysm     Gynecology Sister         early menopause     Osteoporosis Sister        Social History:  Marital Status:  Single [1]  Social History     Tobacco Use     Smoking status: Former Smoker     Types: Cigarettes     Last attempt to quit: 2013     Years since quittin.7     Smokeless tobacco: Never Used   Substance Use Topics     Alcohol use: Yes     Alcohol/week: 3.0 oz     Types: 6 Cans of  beer per week     Drug use: No        Medications:      IBUPROFEN PO         Review of Systems   Musculoskeletal:        Right hand pain     All other systems reviewed and are negative.      Physical Exam   BP: (!) 140/95  Pulse: 89  Temp: 98.1  F (36.7  C)  Resp: 18  Weight: 81.6 kg (180 lb)  SpO2: 100 %      Physical Exam   Constitutional: She is oriented to person, place, and time. She appears well-developed and well-nourished. No distress.   HENT:   Head: Normocephalic and atraumatic.   Eyes: Pupils are equal, round, and reactive to light. No scleral icterus.   Neck: Normal range of motion. Neck supple.   Cardiovascular: Normal rate.   Pulmonary/Chest: Effort normal.   Musculoskeletal: She exhibits tenderness (to martin side right thumb). She exhibits no deformity.   Cap refills and sensation intact. No deformity. No pain with wrist exam. Remaining hand unaffected.   Neurological: She is alert and oriented to person, place, and time.   Skin: Skin is warm and dry. No rash noted. She is not diaphoretic. No erythema. No pallor.   Nursing note and vitals reviewed.      ED Course     Procedures      Results for orders placed or performed during the hospital encounter of 02/04/19 (from the past 24 hour(s))   XR Hand Right G/E 3 Views    Narrative    HAND THREE  VIEWS RIGHT 2/4/2019 7:21 PM     HISTORY: Fall, pain with moving thumb.    COMPARISON: None.    FINDINGS: There is no significant degenerative change. There is no  acute fracture or dislocation. There are no worrisome bony lesions.      Impression    IMPRESSION: No acute osseous abnormality demonstrated.       Medications - No data to display    Assessments & Plan (with Medical Decision Making)  Right hand contusion, no fx on xray, no dislocation.  RICE encouraged, ACE wrap applied for compression.  Can follow up with PCP as needed, reasons to return to the ED discussed.   Offered patient work note which she declined.  She was discharged in stable condition.       I have reviewed the nursing notes.    I have reviewed the findings, diagnosis, plan and need for follow up with the patient.      Final diagnoses:   Contusion of right hand, initial encounter     This document serves as a record of services personally performed by Kelly Alvarado CNP. It was created on their behalf by Michelle Osborne, a trained medical scribe. The creation of this record is based on the provider's personal observations and the statements of the patient. This document has been checked and approved by the attending provider.    Note: Chart documentation done in part with Dragon Voice Recognition software. Although reviewed after completion, some word and grammatical errors may remain.    2/4/2019   [facility] EMERGENCY DEPARTMENT     Maddei Alvarado APRN CNP  02/04/19 [id number]

## 2019-07-26 ENCOUNTER — OFFICE VISIT (OUTPATIENT)
Dept: OBGYN | Facility: OTHER | Age: 39
End: 2019-07-26
Payer: COMMERCIAL

## 2019-07-26 VITALS
HEART RATE: 72 BPM | WEIGHT: 187 LBS | DIASTOLIC BLOOD PRESSURE: 88 MMHG | SYSTOLIC BLOOD PRESSURE: 130 MMHG | BODY MASS INDEX: 32.61 KG/M2

## 2019-07-26 DIAGNOSIS — N92.1 MENORRHAGIA WITH IRREGULAR CYCLE: Primary | ICD-10-CM

## 2019-07-26 LAB
ERYTHROCYTE [DISTWIDTH] IN BLOOD BY AUTOMATED COUNT: 12.3 % (ref 10–15)
HCT VFR BLD AUTO: 38.4 % (ref 35–47)
HGB BLD-MCNC: 13.4 G/DL (ref 11.7–15.7)
MCH RBC QN AUTO: 30.6 PG (ref 26.5–33)
MCHC RBC AUTO-ENTMCNC: 34.9 G/DL (ref 31.5–36.5)
MCV RBC AUTO: 88 FL (ref 78–100)
PLATELET # BLD AUTO: 290 10E9/L (ref 150–450)
RBC # BLD AUTO: 4.38 10E12/L (ref 3.8–5.2)
TSH SERPL DL<=0.005 MIU/L-ACNC: 2.67 MU/L (ref 0.4–4)
WBC # BLD AUTO: 5.4 10E9/L (ref 4–11)

## 2019-07-26 PROCEDURE — 99203 OFFICE O/P NEW LOW 30 MIN: CPT | Performed by: ADVANCED PRACTICE MIDWIFE

## 2019-07-26 PROCEDURE — 84443 ASSAY THYROID STIM HORMONE: CPT | Performed by: ADVANCED PRACTICE MIDWIFE

## 2019-07-26 PROCEDURE — 36415 COLL VENOUS BLD VENIPUNCTURE: CPT | Performed by: ADVANCED PRACTICE MIDWIFE

## 2019-07-26 PROCEDURE — 85027 COMPLETE CBC AUTOMATED: CPT | Performed by: ADVANCED PRACTICE MIDWIFE

## 2019-07-26 RX ORDER — MEDROXYPROGESTERONE ACETATE 10 MG
10 TABLET ORAL DAILY
Qty: 10 TABLET | Refills: 0 | Status: SHIPPED | OUTPATIENT
Start: 2019-07-26 | End: 2019-08-05

## 2019-07-26 NOTE — NURSING NOTE
"Chief Complaint   Patient presents with     Abnormal Uterine Bleeding     irregular menstrual cycle       Initial /88 (BP Location: Left arm, Patient Position: Sitting, Cuff Size: Adult Large)   Pulse 72   Wt 84.8 kg (187 lb)   LMP 07/25/2019 (Exact Date)   BMI (P) 32.61 kg/m   Estimated body mass index is 32.61 kg/m  (pended) as calculated from the following:    Height as of 1/8/19: (P) 1.613 m (5' 3.5\").    Weight as of this encounter: 84.8 kg (187 lb).  Medication Reconciliation: complete    Yaz Tao CMA    "

## 2019-07-26 NOTE — PROGRESS NOTES
"Kirstie Stoll is a 38 year old who presents to the clinic for evaluation of menorrhagia with an irregular cycle.   Had some polyps removed about 5 years ago and menses were regular after that.   In the last 2-3 months, she is not exactly sure how long has been bleeding 2-3 weeks out of the month and last month feels that she bled the entire month.   Bleeding stopped about 5 days ago but started again yesterday so she wants to decline an exam.   Uncertain how many pads she goes through.   Has not had sex in \"years\"  Has no desire for more children.    Has been dizzy for a couple months even when laying down.     Histories reviewed and updated  Past Medical History:   Diagnosis Date     Vaginal bleeding, abnormal     ongoing since about      Past Surgical History:   Procedure Laterality Date     DILATION AND CURETTAGE, HYSTEROSCOPY DIAGNOSTIC, COMBINED  11/15/2012    Procedure: COMBINED DILATION AND CURETTAGE, HYSTEROSCOPY DIAGNOSTIC;  Hysteroscopy, D&C, polypectomy;  Surgeon: Joslyn Rosales DO;  Location: [facility]     OPEN REDUCTION INTERNAL FIXATION WRIST      right wrist     Social History     Socioeconomic History     Marital status: Single     Spouse name: Not on file     Number of children: Not on file     Years of education: Not on file     Highest education level: Not on file   Occupational History     Not on file   Social Needs     Financial resource strain: Not on file     Food insecurity:     Worry: Not on file     Inability: Not on file     Transportation needs:     Medical: Not on file     Non-medical: Not on file   Tobacco Use     Smoking status: Former Smoker     Types: Cigarettes     Last attempt to quit: 2013     Years since quittin.2     Smokeless tobacco: Never Used   Substance and Sexual Activity     Alcohol use: Yes     Alcohol/week: 3.0 oz     Types: 6 Cans of beer per week     Drug use: No     Sexual activity: Yes     Partners: Male     Birth control/protection: Condom "   Lifestyle     Physical activity:     Days per week: Not on file     Minutes per session: Not on file     Stress: Not on file   Relationships     Social connections:     Talks on phone: Not on file     Gets together: Not on file     Attends Faith service: Not on file     Active member of club or organization: Not on file     Attends meetings of clubs or organizations: Not on file     Relationship status: Not on file     Intimate partner violence:     Fear of current or ex partner: Not on file     Emotionally abused: Not on file     Physically abused: Not on file     Forced sexual activity: Not on file   Other Topics Concern     Parent/sibling w/ CABG, MI or angioplasty before 65F 55M? Not Asked   Social History Narrative     Not on file     Family History   Problem Relation Age of Onset     Gynecology Mother         early menopause     Cardiovascular Father         anerysm     Gynecology Sister         early menopause     Osteoporosis Sister             ROS: 10 point ROS neg other than the symptoms noted above in the HPI.      EXAM:  /88 (BP Location: Left arm, Patient Position: Sitting, Cuff Size: Adult Large)   Pulse 72   Wt 84.8 kg (187 lb)   LMP 07/25/2019 (Exact Date)   BMI (P) 32.61 kg/m      DECLINES       ASSESSMENT/PLAN:    (N92.1) Menorrhagia with irregular cycle  (primary encounter diagnosis)  Comment:   Plan: CBC with platelets, TSH with free T4 reflex, US        Pelvic Complete w Transvaginal,         medroxyPROGESTERone (PROVERA) 10 MG tablet            Will plan testing to r/o anemia and to assess the uterus.   She is not certain what she would like to do about the bleeding.   Ok with provera for the short term  Recommended she follow up with FP to assess her blood pressure and because of the elevations would be concerned about estrogen containing contraceptives  Reviewed depo, Mirena, ablation and hyst.  Recommending the least invasive first.   Provided with information regarding  mirena and ablation.      Visit length 30 minutes was spent face to face with the patient today discussing her history, diagnosis, and follow-up plan as noted above.  Over 50% of the visit was spent in counseling and coordination of care.    Time noted does not include time required to complete procedures.

## 2019-07-29 ENCOUNTER — ANCILLARY PROCEDURE (OUTPATIENT)
Dept: ULTRASOUND IMAGING | Facility: OTHER | Age: 39
End: 2019-07-29
Attending: ADVANCED PRACTICE MIDWIFE
Payer: COMMERCIAL

## 2019-07-29 DIAGNOSIS — N92.1 MENORRHAGIA WITH IRREGULAR CYCLE: ICD-10-CM

## 2019-07-29 PROCEDURE — 76830 TRANSVAGINAL US NON-OB: CPT

## 2019-07-29 PROCEDURE — 76856 US EXAM PELVIC COMPLETE: CPT

## 2019-08-22 ENCOUNTER — OFFICE VISIT (OUTPATIENT)
Dept: OBGYN | Facility: OTHER | Age: 39
End: 2019-08-22
Payer: COMMERCIAL

## 2019-08-22 VITALS
BODY MASS INDEX: 32.17 KG/M2 | DIASTOLIC BLOOD PRESSURE: 78 MMHG | WEIGHT: 184.5 LBS | HEART RATE: 72 BPM | SYSTOLIC BLOOD PRESSURE: 118 MMHG

## 2019-08-22 DIAGNOSIS — N92.0 MENORRHAGIA WITH REGULAR CYCLE: ICD-10-CM

## 2019-08-22 DIAGNOSIS — Z30.430 ENCOUNTER FOR IUD INSERTION: Primary | ICD-10-CM

## 2019-08-22 DIAGNOSIS — Z97.5 IUD (INTRAUTERINE DEVICE) IN PLACE: ICD-10-CM

## 2019-08-22 PROCEDURE — 58300 INSERT INTRAUTERINE DEVICE: CPT | Performed by: ADVANCED PRACTICE MIDWIFE

## 2019-08-22 NOTE — NURSING NOTE
"Chief Complaint   Patient presents with     IUD     IUD insertion       Initial /78 (BP Location: Left arm, Patient Position: Sitting, Cuff Size: Adult Regular)   Pulse 72   Wt 83.7 kg (184 lb 8 oz)   LMP 08/22/2019 (Exact Date)   BMI (P) 32.17 kg/m   Estimated body mass index is 32.17 kg/m  (pended) as calculated from the following:    Height as of 1/8/19: (P) 1.613 m (5' 3.5\").    Weight as of this encounter: 83.7 kg (184 lb 8 oz).  Medication Reconciliation: complete    Yaz Tao CMA      "

## 2019-08-22 NOTE — PATIENT INSTRUCTIONS
What Mirena Users May Expect    What to watch for right after Mirena is placed  Some women may experience uterine cramps, bleeding, and/or dizziness during and right after Mirena is placed. To help minimize the cramps, you may taken ibuprofen 600 mg with food prior to and every 6 hours after your appointment if needed. These symptoms should improve over the next 24 hours.  Mild cramping may be present for a few days after your placement  As a follow up, you should visit your clinic once in the first 4 to 12 weeks after Mirena is placed to make sure it is in the right position. After that, Mirena can be checked once a year as part of your routine exam.    Please use a back-up method (abstinence or condoms) for 7 days after placement. Unless instructed differently at your appointment    Your periods may change  For the first 3 to 6 months, your monthly period may become irregular. You may also have frequent spotting or light bleeding. A few women have heavy bleeding during this time. After your body adjusts, the number of bleeding days is likely to decrease (but may remain irregular), and you may even find that your periods stop altogether for as long as Mirena is in place. Around the end of the third month of use, you may see up to a 75% reduction in the amount of menstrual bleeding. By one year, about 1 out of 5 users may hay have no period at all. At the end of two years, 70% have little or no bleeding. Your periods will return rapidly once Mirena is removed.     Mirena Strings  You may check your own Mirena strings by inserting a finger into the vagina and feeling the strings as they exit the cervix.  The strings will initially feel firm, like fishing line, but will soften over a few weeks.  After the strings have softened, you or your partner should not be able to feel the strings during intercourse. If your partner continues to feel the strings you can have them shortened by your provider If you can feel the  IUD, see your healthcare provider to have the position confirmed.  You may use tampons with Mirena in place.    Mirena does not protect against HIV or STDs.  Mirena does not prevent the formation of ovarian cysts.  Mirena does not typically reduce acne or cause weight gain or mood changes.    Call the clinic immediately if you:  Notice any change in the length of the strings or can feel part of the IUD  Have pain or bleeding with sex.  Have unusually heavy bleeding from the vagina.   Think you are pregnant  Have been or might have been exposed to a sexually transmitted infection  Have unusual pelvic pain, cramping or soreness in your abdomen  Have unexplained fever or chills.       Please call [facility] [place] [phone]  if you have questions or concerns.    For more information:  [email or web address]

## 2019-08-22 NOTE — PROGRESS NOTES
CC/HPI: Kirstie Stoll is a 38 year old who presents to the clinic for an IUD insertion.   Patient's last menstrual period was 2019 (exact date)..   Current birth control method: abstinence  Indication for insertion:   birth control and cycle control/menorrhagia   Patient has been provided with written information.  I have reviewed the risks of the IUD including pregnancy, PID, life threatening infection, perforation, expulsion, cramping, changes in bleeding and ovarian cysts. Benefits of the IUD and alternative birth control and cycle control methods have been discussed.  Patients questions have been answered.  Patient has verbalized understanding of risks and benefits and has signed the consent form.    Allergies   Allergen Reactions     Amoxicillin Swelling     Current Outpatient Medications   Medication Sig Dispense Refill     IBUPROFEN PO         Past Medical History:   Diagnosis Date     Vaginal bleeding, abnormal     ongoing since about      Family History   Problem Relation Age of Onset     Gynecology Mother         early menopause     Cardiovascular Father         anerysm     Gynecology Sister         early menopause     Osteoporosis Sister      Social History     Socioeconomic History     Marital status: Single     Spouse name: Not on file     Number of children: Not on file     Years of education: Not on file     Highest education level: Not on file   Occupational History     Not on file   Social Needs     Financial resource strain: Not on file     Food insecurity:     Worry: Not on file     Inability: Not on file     Transportation needs:     Medical: Not on file     Non-medical: Not on file   Tobacco Use     Smoking status: Former Smoker     Types: Cigarettes     Last attempt to quit: 2013     Years since quittin.2     Smokeless tobacco: Never Used   Substance and Sexual Activity     Alcohol use: Yes     Alcohol/week: 3.0 oz     Types: 6 Cans of beer per week     Drug use: No     Sexual  activity: Yes     Partners: Male     Birth control/protection: Condom   Lifestyle     Physical activity:     Days per week: Not on file     Minutes per session: Not on file     Stress: Not on file   Relationships     Social connections:     Talks on phone: Not on file     Gets together: Not on file     Attends Adventism service: Not on file     Active member of club or organization: Not on file     Attends meetings of clubs or organizations: Not on file     Relationship status: Not on file     Intimate partner violence:     Fear of current or ex partner: Not on file     Emotionally abused: Not on file     Physically abused: Not on file     Forced sexual activity: Not on file   Other Topics Concern     Parent/sibling w/ CABG, MI or angioplasty before 65F 55M? Not Asked   Social History Narrative     Not on file     Past Surgical History:   Procedure Laterality Date     DILATION AND CURETTAGE, HYSTEROSCOPY DIAGNOSTIC, COMBINED  11/15/2012    Procedure: COMBINED DILATION AND CURETTAGE, HYSTEROSCOPY DIAGNOSTIC;  Hysteroscopy, D&C, polypectomy;  Surgeon: Joslny Rosales DO;  Location: [facility]     OPEN REDUCTION INTERNAL FIXATION WRIST      right wrist         EXAM:  /78 (BP Location: Left arm, Patient Position: Sitting, Cuff Size: Adult Regular)   Pulse 72   Wt 83.7 kg (184 lb 8 oz)   LMP 08/22/2019 (Exact Date)   BMI (P) 32.17 kg/m    PELVIC EXAM:  Vulva: No external lesions, normal hair distribution, no adenopathy, BUS WNL  Vagina: Moist, pink, no abnormal discharge, well rugated, no lesions  Cervix: smooth, pink, no visible lesions, neg CMT   Uterus: Normal size, Retroverted , non-tender, mobile  Ovaries: No mass, non-tender, mobile  Rectal exam: deferred    IUD type: Mirena  Lot # [id number]  NDC# 26060-306-98 Mirena    EXP DATE:   11/21        Procedure:  Uterus assessed for position and is Retroverted.  Speculum inserted.  Betadine prep of cervix done.  Tenaculum applied at  10/2  o'clock position and  gentle traction was appiled to elongate the cervical canal.  Uterus sounded to 8 cm's.   IUD inserted in the usual fashion according to manufacture's instructions, without significant resistance, severe protracted pain or excessive bleeding. The tenaculum was removed with scant bleeding from the puncture sites  Strings trimmed to 3 cm's.  Patient  tolerated the procedure well without any prolonged pain or syncopy.    ASSESSMENT/ PLAN:  (Z30.430) Encounter for IUD insertion  (primary encounter diagnosis)  Comment:   Plan: levonorgestrel (MIRENA) 20 MCG/24HR IUD, HC         LEVONORGESTREL IU 52MG 5 YR, INSERTION         INTRAUTERINE DEVICE            (Z97.5) IUD (intrauterine device) in place  Comment:   Plan: levonorgestrel (MIRENA) 20 MCG/24HR IUD, HC         LEVONORGESTREL IU 52MG 5 YR, INSERTION         INTRAUTERINE DEVICE            (N92.0) Menorrhagia with regular cycle  Comment:   Plan: levonorgestrel (MIRENA) 20 MCG/24HR IUD, HC         LEVONORGESTREL IU 52MG 5 YR, INSERTION         INTRAUTERINE DEVICE                  Instructions given to patient regarding checking IUD strings, returning to the clinic if pain, fever, unusual vaginal discharge or inability to check strings and/or irregular bleeding and avoiding placing anything in her vagina for the next 24 hours.  BUM of birth control not indicated    Return to the clinic in 4-6 weeks for IUD follow up   See AVS for complete instructions

## 2020-05-18 ENCOUNTER — VIRTUAL VISIT (OUTPATIENT)
Dept: FAMILY MEDICINE | Facility: CLINIC | Age: 40
End: 2020-05-18
Payer: COMMERCIAL

## 2020-05-18 DIAGNOSIS — R10.13 EPIGASTRIC PAIN: Primary | ICD-10-CM

## 2020-05-18 DIAGNOSIS — K92.1 BLOODY STOOLS: ICD-10-CM

## 2020-05-18 DIAGNOSIS — M25.50 MULTIPLE JOINT PAIN: ICD-10-CM

## 2020-05-18 PROCEDURE — 99213 OFFICE O/P EST LOW 20 MIN: CPT | Mod: 95 | Performed by: PHYSICIAN ASSISTANT

## 2020-05-18 RX ORDER — OMEPRAZOLE 40 MG/1
40 CAPSULE, DELAYED RELEASE ORAL DAILY
Qty: 30 CAPSULE | Refills: 0 | Status: SHIPPED | OUTPATIENT
Start: 2020-05-18

## 2020-05-18 NOTE — PROGRESS NOTES
"Kirstie Stoll is a 39 year old female who is being evaluated via a billable telephone visit.      The patient has been notified of following:     \"This telephone visit will be conducted via a call between you and your physician/provider. We have found that certain health care needs can be provided without the need for a physical exam.  This service lets us provide the care you need with a short phone conversation.  If a prescription is necessary we can send it directly to your pharmacy.  If lab work is needed we can place an order for that and you can then stop by our lab to have the test done at a later time.    Telephone visits are billed at different rates depending on your insurance coverage. During this emergency period, for some insurers they may be billed the same as an in-person visit.  Please reach out to your insurance provider with any questions.    If during the course of the call the physician/provider feels a telephone visit is not appropriate, you will not be charged for this service.\"    Patient has given verbal consent for Telephone visit?  Yes    What phone number would you like to be contacted at? [phone]    How would you like to obtain your AVS? Mail a copy    Subjective     Kirstie Stoll is a 39 year old female who presents via phone visit today for the following health issues:    HPI  ABDOMINAL   PAIN     Onset: 1 month    Description:   Character: Burning and Cramping  Location: middle, upper stomach  Radiation: None    Intensity: severe    Progression of Symptoms:  worsening and constant    Accompanying Signs & Symptoms:  Fever/Chills?: YES- chills  Gas/Bloating: YES  Nausea: YES  Vomitting: no   Diarrhea?: no   Constipation:YES  Dysuria or Hematuria: no    History:   Trauma: no   Previous similar pain: no    Previous tests done: none    Precipitating factors:   Does the pain change with:     Food: YES     BM: no     Urination: no     Alleviating factors:  Mint tea, banana     Therapies " Tried and outcome: banana, mint tea    LMP:  not applicable     Patient is a 39 year old female who calls in today to discuss epigastric pain. At the beginning of the visit the patient reported bloody stools for the past month. She says that she has blood in the bowl following bowel movements. In addition, She notes joint pain in the wrists and knees for years, feeling feverish off/on, abdominal pain for days following irritating substances such as alcohol or NSAIDs, off/on dizziness and the associated symptoms listed above. Denies pain with bowel movements, fatigue, mouth sores, skin rash or family/personal history of IBD/RA/scleroderma, etc.     Reviewed and updated as needed this visit by Provider         Review of Systems   Constitutional, HEENT, cardiovascular, pulmonary, gi and gu systems are negative, except as otherwise noted.       Objective   Reported vitals:  There were no vitals taken for this visit.   healthy, alert and no distress  PSYCH: Alert and oriented times 3; coherent speech, normal   rate and volume, able to articulate logical thoughts, able   to abstract reason, no tangential thoughts, no hallucinations   or delusions  Her affect is normal  RESP: No cough, no audible wheezing, able to talk in full sentences  Remainder of exam unable to be completed due to telephone visits        Assessment/Plan:  1. Epigastric pain  Patient will start omeprazole for some symptom relief. She was instructed to take the medication 30 minutes prior to a meal. Educated on timeframe for improvement.   - omeprazole (PRILOSEC) 40 MG DR capsule; Take 1 capsule (40 mg) by mouth daily 30 minutes before a meal  Dispense: 30 capsule; Refill: 0    2. Bloody stools  Discussed with patient that she would benefit from face to face visit to further evaluate her symptoms to assess for hemorrhoids vs IBD or other causes.     3. Multiple joint pain  See above.       Return in about 3 days (around 5/21/2020) for Workup for bloody  stools.        Howard Copeland PA-C

## 2020-05-18 NOTE — NURSING NOTE
Health Maintenance Due   Topic Date Due     PREVENTIVE CARE VISIT  03/02/2019     PHQ-2  01/01/2020     Nini REED LPN

## 2020-05-19 NOTE — PATIENT INSTRUCTIONS
Patient Education     Lifestyle Changes for Controlling GERD  When you have GERD, stomach acid feels as if it s backing up toward your mouth. Whether or not you take medicine to control your GERD, your symptoms can often be improved with lifestyle changes. Talk to your healthcare provider about the following suggestions. These suggestions may help you get relief from your symptoms.      Raise your head  Reflux is more likely to strike when you re lying down flat, because stomach fluid can flow backward more easily. Raising the head of your bed 4 to 6 inches can help. To do this:    Slide blocks or books under the legs at the head of your bed. Or, place a wedge under the mattress. [employer] can make a suitable wedge for you. The wedge should run from your waist to the top of your head.    Don t just prop your head on several pillows. This increases pressure on your stomach. It can make GERD worse.  Watch your eating habits  Certain foods may increase the acid in your stomach or relax the lower esophageal sphincter. This makes GERD more likely. It s best to avoid the following if they cause you symptoms:    Coffee, tea, and carbonated drinks (with and without caffeine)    Fatty, fried, or spicy food    Mint, chocolate, onions, and tomatoes    Peppermint    Any other foods that seem to irritate your stomach or cause you pain  Relieve the pressure  Tips include the following:    Eat smaller meals, even if you have to eat more often.    Don t lie down right after you eat. Wait a few hours for your stomach to empty.    Avoid tight belts and tight-fitting clothes.    Lose excess weight.  Tobacco and alcohol  Avoid smoking tobacco and drinking alcohol. They can make GERD symptoms worse.  Date Last Reviewed: 7/1/2016 2000-2019 [employer]. [address] [place] [address]. All rights reserved. This information is not intended as a substitute for professional medical care. Always follow your  healthcare professional's instructions.

## 2020-05-21 ENCOUNTER — TELEPHONE (OUTPATIENT)
Dept: FAMILY MEDICINE | Facility: CLINIC | Age: 40
End: 2020-05-21

## 2020-05-21 ENCOUNTER — OFFICE VISIT (OUTPATIENT)
Dept: FAMILY MEDICINE | Facility: CLINIC | Age: 40
End: 2020-05-21
Payer: COMMERCIAL

## 2020-05-21 VITALS
BODY MASS INDEX: 32.71 KG/M2 | TEMPERATURE: 97.4 F | OXYGEN SATURATION: 99 % | WEIGHT: 187.6 LBS | DIASTOLIC BLOOD PRESSURE: 68 MMHG | RESPIRATION RATE: 14 BRPM | HEART RATE: 84 BPM | SYSTOLIC BLOOD PRESSURE: 106 MMHG

## 2020-05-21 DIAGNOSIS — K62.5 BRIGHT RED RECTAL BLEEDING: ICD-10-CM

## 2020-05-21 DIAGNOSIS — R10.10 PAIN OF UPPER ABDOMEN: Primary | ICD-10-CM

## 2020-05-21 LAB
ALBUMIN SERPL-MCNC: 3.8 G/DL (ref 3.4–5)
ALP SERPL-CCNC: 89 U/L (ref 40–150)
ALT SERPL W P-5'-P-CCNC: 23 U/L (ref 0–50)
AMYLASE SERPL-CCNC: 88 U/L (ref 30–110)
ANION GAP SERPL CALCULATED.3IONS-SCNC: 2 MMOL/L (ref 3–14)
AST SERPL W P-5'-P-CCNC: 15 U/L (ref 0–45)
BASOPHILS # BLD AUTO: 0.1 10E9/L (ref 0–0.2)
BASOPHILS NFR BLD AUTO: 1.4 %
BILIRUB SERPL-MCNC: 0.5 MG/DL (ref 0.2–1.3)
BUN SERPL-MCNC: 16 MG/DL (ref 7–30)
CALCIUM SERPL-MCNC: 8.2 MG/DL (ref 8.5–10.1)
CHLORIDE SERPL-SCNC: 111 MMOL/L (ref 94–109)
CO2 SERPL-SCNC: 27 MMOL/L (ref 20–32)
CREAT SERPL-MCNC: 0.95 MG/DL (ref 0.52–1.04)
CRP SERPL-MCNC: <2.9 MG/L (ref 0–8)
DIFFERENTIAL METHOD BLD: NORMAL
EOSINOPHIL NFR BLD AUTO: 5.5 %
ERYTHROCYTE [DISTWIDTH] IN BLOOD BY AUTOMATED COUNT: 11.5 % (ref 10–15)
GFR SERPL CREATININE-BSD FRML MDRD: 76 ML/MIN/{1.73_M2}
GLUCOSE SERPL-MCNC: 99 MG/DL (ref 70–99)
HCT VFR BLD AUTO: 41.8 % (ref 35–47)
HGB BLD-MCNC: 14.5 G/DL (ref 11.7–15.7)
IMM GRANULOCYTES # BLD: 0 10E9/L (ref 0–0.4)
IMM GRANULOCYTES NFR BLD: 0.4 %
LIPASE SERPL-CCNC: 274 U/L (ref 73–393)
LYMPHOCYTES # BLD AUTO: 1.6 10E9/L (ref 0.8–5.3)
LYMPHOCYTES NFR BLD AUTO: 30.5 %
MCH RBC QN AUTO: 31 PG (ref 26.5–33)
MCHC RBC AUTO-ENTMCNC: 34.7 G/DL (ref 31.5–36.5)
MCV RBC AUTO: 90 FL (ref 78–100)
MONOCYTES # BLD AUTO: 0.6 10E9/L (ref 0–1.3)
MONOCYTES NFR BLD AUTO: 11.7 %
NEUTROPHILS # BLD AUTO: 2.6 10E9/L (ref 1.6–8.3)
NEUTROPHILS NFR BLD AUTO: 50.5 %
NRBC # BLD AUTO: 0 10*3/UL
NRBC BLD AUTO-RTO: 0 /100
PLATELET # BLD AUTO: 319 10E9/L (ref 150–450)
POTASSIUM SERPL-SCNC: 4.4 MMOL/L (ref 3.4–5.3)
PROT SERPL-MCNC: 7.7 G/DL (ref 6.8–8.8)
RBC # BLD AUTO: 4.67 10E12/L (ref 3.8–5.2)
SODIUM SERPL-SCNC: 140 MMOL/L (ref 133–144)
WBC # BLD AUTO: 5.1 10E9/L (ref 4–11)

## 2020-05-21 PROCEDURE — 99214 OFFICE O/P EST MOD 30 MIN: CPT | Performed by: NURSE PRACTITIONER

## 2020-05-21 PROCEDURE — 82150 ASSAY OF AMYLASE: CPT | Performed by: NURSE PRACTITIONER

## 2020-05-21 PROCEDURE — 83690 ASSAY OF LIPASE: CPT | Performed by: NURSE PRACTITIONER

## 2020-05-21 PROCEDURE — 80053 COMPREHEN METABOLIC PANEL: CPT | Performed by: NURSE PRACTITIONER

## 2020-05-21 PROCEDURE — 85025 COMPLETE CBC W/AUTO DIFF WBC: CPT | Performed by: NURSE PRACTITIONER

## 2020-05-21 PROCEDURE — 36415 COLL VENOUS BLD VENIPUNCTURE: CPT | Performed by: NURSE PRACTITIONER

## 2020-05-21 PROCEDURE — 86140 C-REACTIVE PROTEIN: CPT | Performed by: NURSE PRACTITIONER

## 2020-05-21 ASSESSMENT — PAIN SCALES - GENERAL: PAINLEVEL: MODERATE PAIN (5)

## 2020-05-21 NOTE — PATIENT INSTRUCTIONS
Liquid diet 2 days.    Continue with Omeprazole but take 2 times daily.     Low residue diet - I will give you information on this.     Take Metamucil daily recommended dose with 20 ounces of clear liquids. At least 80 ounces of clear liquids daily.     No alcohol for the next 2 weeks.     I will set up follow up appointment with you by phone next week.     We will call you with lab results.     If the pain or bleeding gets worse - present to ER.     Abner Izaguirre CNP

## 2020-05-21 NOTE — TELEPHONE ENCOUNTER
LMTCB- When patient returns call please relay message from provider. Earline Monson LPN........5/21/2020 4:09 PM

## 2020-05-21 NOTE — TELEPHONE ENCOUNTER
----- Message from Abner Izaguirre NP sent at 5/21/2020  4:06 PM CDT -----  Please call with results. Please ensure that labs all stable and or in normal ranges. No concerning findings.    Thank you,      Abner Izaguirre NP on 5/21/2020 at 4:06 PM

## 2020-05-21 NOTE — PROGRESS NOTES
Subjective     Kirstie Stoll is a 39 year old female who presents to clinic today for the following health issues:    39-year-old female patient that presents today for upper abdominal pain that is been ongoing for 4 weeks, she was recently just started on omeprazole but it is been less than a week.  She also drinks heavily 3 days a week anywhere from 6-8 drinks a day.  She has had some bright red blood in her stool over the last few weeks definitely not every day.  She is negative for vomiting or constipation but does have some mild nausea.  She is eating and drinking okay but does notice more pain at the end of the day with activity.  She is not in acute pain but pain can get up into the 5 or 6.  Mint leaf does help the pain.  Negative for fevers or chills of any kind no other body aches or malaise.  Negative for any sinus or respiratory congestion of any kind.  Sometimes has a fullness or cramping that comes with the pain some stabbing in the epigastric region of her abdomen.  She is somewhat concerned for ulcers.  Would like to pursue conservative measures at this time due to cost and insurance.  Does not feel as though she is acutely ill and feels she could have waited some time to get in for appointment but with the weekend wanted to come in.    HPI   ABDOMINAL   PAIN     Onset: x1 month    Description:   Character: Sharp, Dull ache, Stabbing, Fullness and Cramping  Location: left lower quadrant stabbing pain but whole abdomen is painful  Radiation: None    Intensity: mild to severe    Progression of Symptoms:  worsening    Accompanying Signs & Symptoms:  Fever/Chills?: YES- chills  Gas/Bloating: YES  Nausea: YES  Vomitting: no   Diarrhea?: no   Constipation:YES- sometimes  Dysuria or Hematuria: no    History:   Trauma: no   Previous similar pain: no    Previous tests done: none    Precipitating factors:   Does the pain change with:     Food: YES- makes it worse sometimes      BM: no     Urination: no      Alleviating factors:  Mint tea    Therapies Tried and outcome: Mint tea helps a little     LMP:  unknown has IUD      ** Also has bright red blood in stool for the past month**            Patient Active Problem List   Diagnosis     CARDIOVASCULAR SCREENING; LDL GOAL LESS THAN 160     Menometrorrhagia     Anxiety attack     Endometrial polyp     Past Surgical History:   Procedure Laterality Date     DILATION AND CURETTAGE, HYSTEROSCOPY DIAGNOSTIC, COMBINED  11/15/2012    Procedure: COMBINED DILATION AND CURETTAGE, HYSTEROSCOPY DIAGNOSTIC;  Hysteroscopy, D&C, polypectomy;  Surgeon: Joslyn Rosales DO;  Location: [facility] OR     OPEN REDUCTION INTERNAL FIXATION WRIST      right wrist       Social History     Tobacco Use     Smoking status: Former Smoker     Types: Cigarettes     Last attempt to quit: 2013     Years since quittin.0     Smokeless tobacco: Never Used   Substance Use Topics     Alcohol use: Yes     Alcohol/week: 5.0 standard drinks     Types: 6 Cans of beer per week     Family History   Problem Relation Age of Onset     Gynecology Mother         early menopause     Cardiovascular Father         anerysm     Gynecology Sister         early menopause     Osteoporosis Sister          Current Outpatient Medications   Medication Sig Dispense Refill     IBUPROFEN PO        omeprazole (PRILOSEC) 40 MG DR capsule Take 1 capsule (40 mg) by mouth daily 30 minutes before a meal 30 capsule 0     levonorgestrel (MIRENA) 20 MCG/24HR IUD 1 each (20 mcg) by Intrauterine route once for 1 dose 1 each 0     Allergies   Allergen Reactions     Amoxicillin Swelling       Reviewed and updated as needed this visit by Provider         Review of Systems   Constitutional, HEENT, cardiovascular, pulmonary, GI, , musculoskeletal, neuro, skin, endocrine and psych systems are negative, except as otherwise noted.      Objective    There were no vitals taken for this visit.  There is no height or weight on file to  calculate BMI.  Physical Exam   GENERAL: healthy, alert and no distress  NECK: no adenopathy, no asymmetry, masses, or scars and thyroid normal to palpation  RESP: lungs clear to auscultation - no rales, rhonchi or wheezes  CV: regular rate and rhythm, normal S1 S2, no S3 or S4, no murmur, click or rub, no peripheral edema and peripheral pulses strong  ABDOMEN: soft, nontender, no hepatosplenomegaly, no masses and bowel sounds normal  MS: no gross musculoskeletal defects noted, no edema  SKIN: no suspicious lesions or rashes  NEURO: Normal strength and tone, mentation intact and speech normal  BACK: no CVA tenderness, no paralumbar tenderness  PSYCH: mentation appears normal, affect normal/bright    Diagnostic Test Results:  Labs reviewed in Epic        Assessment & Plan     1. Pain of upper abdomen  -Reviewed up-to-date with patient in regards to a conservative work-up for the abdominal pain.  Due to the heavy drinking a lot of check liver lipase and amylase as well as CRP.  Because she has had episodes of bleeding for the last 4 weeks I would like to check her CBC along with her electrolytes.  We will also get an occult stool for her.  -Plan will be for liquid diet for the next 2 days then move her to a low residue diet, and then increase the omeprazole and have her take Metamucil to make sure we do get enough fiber in the diet so she does not become constipated.  - CBC with platelets and differential  - Comprehensive metabolic panel (BMP + Alb, Alk Phos, ALT, AST, Total. Bili, TP)  - Occult blood stool 1-3 spec; Future  - Lipase  - Amylase  - CRP, inflammation    2. Bright red rectal bleeding  -Plan as discussed above occult stool to assess for bright red bleeding she is aware that if this comes back positive she will need a colonoscopy.  - CBC with platelets and differential  - Comprehensive metabolic panel (BMP + Alb, Alk Phos, ALT, AST, Total. Bili, TP)  - Occult blood stool 1-3 spec; Future  - Lipase  -  Amylase  - CRP, inflammation     -I will plan to follow-up with the patient in a week by phone.  She will be called with all lab results.    -Patient was informed to present to ER with more bleeding or increased pain or signs of infection to include fever over 100.4.    Patient verbalized understanding of plan of care and is in agreement with current plan of care.    Return in about 1 week (around 5/28/2020), or Follow up abdominal pain..    Abner Izaguirre NP  [facility]

## 2020-05-21 NOTE — LETTER
[address] [id number] [phone]        May 22, 2020    Kirstie Stoll  [address] [place] [id number]          Dear Kirstie,       We have tried several times to reach you buy phone to inform you of the following: labs all stable and or in normal ranges. No concerning findings.         Sincerely,          Abner Izaguirre NP

## 2021-05-24 ENCOUNTER — RECORDS - HEALTHEAST (OUTPATIENT)
Dept: ADMINISTRATIVE | Facility: CLINIC | Age: 41
End: 2021-05-24

## 2021-06-21 ENCOUNTER — VIRTUAL VISIT (OUTPATIENT)
Dept: FAMILY MEDICINE | Facility: OTHER | Age: 41
End: 2021-06-21
Payer: COMMERCIAL

## 2021-06-21 DIAGNOSIS — R05.9 COUGH: ICD-10-CM

## 2021-06-21 DIAGNOSIS — J02.9 SORE THROAT: ICD-10-CM

## 2021-06-21 DIAGNOSIS — J02.9 SORE THROAT: Primary | ICD-10-CM

## 2021-06-21 LAB
DEPRECATED S PYO AG THROAT QL EIA: NEGATIVE
SARS-COV-2 RNA RESP QL NAA+PROBE: NORMAL
SPECIMEN SOURCE: NORMAL
STREP GROUP A PCR: NOT DETECTED

## 2021-06-21 PROCEDURE — 87651 STREP A DNA AMP PROBE: CPT | Performed by: PHYSICIAN ASSISTANT

## 2021-06-21 PROCEDURE — 99213 OFFICE O/P EST LOW 20 MIN: CPT | Mod: 95 | Performed by: PHYSICIAN ASSISTANT

## 2021-06-21 PROCEDURE — U0005 INFEC AGEN DETEC AMPLI PROBE: HCPCS | Performed by: PHYSICIAN ASSISTANT

## 2021-06-21 PROCEDURE — 99N1174 PR STATISTIC STREP A RAPID: Performed by: PHYSICIAN ASSISTANT

## 2021-06-21 PROCEDURE — U0003 INFECTIOUS AGENT DETECTION BY NUCLEIC ACID (DNA OR RNA); SEVERE ACUTE RESPIRATORY SYNDROME CORONAVIRUS 2 (SARS-COV-2) (CORONAVIRUS DISEASE [COVID-19]), AMPLIFIED PROBE TECHNIQUE, MAKING USE OF HIGH THROUGHPUT TECHNOLOGIES AS DESCRIBED BY CMS-2020-01-R: HCPCS | Performed by: PHYSICIAN ASSISTANT

## 2021-06-21 NOTE — PROGRESS NOTES
Kirstie is a 40 year old who is being evaluated via a billable telephone visit.      What phone number would you like to be contacted at? [phone]  How would you like to obtain your AVS? MyChart    Assessment & Plan     Sore throat  Cough  At this time question strep, COVID, other viral illness. She is breathing comfortably on the phone without cough.  Discussed most bronchitis is viral.  She was ok going forward with both Strep and COVID testing.  We will treat according to results.  Has been on cephalexin in past without side effects if positive for strep this may be a good option for her.  Discussed symptomatic treatment with OTC therapies to help in the meantime.   - Streptococcus A Rapid Scr w Reflx to PCR; Future  - Symptomatic COVID-19 Virus (Coronavirus) by PCR; Future      Return in about 3 days (around 6/24/2021) for If not improving, sooner if worse or new concerns.    Options for treatment and follow-up care were reviewed with the patient and/or guardian. Patient and/or guardian engaged in the decision making process and verbalized understanding of the options discussed and agreed with the final plan.    GOPAL [facility]    Amy Thacker is a 40 year old who presents for the following health issues   HPI     Acute Illness  Acute illness concerns: Strep or Bronchitis - bad headache, sore throat  Onset/Duration: Thursday  Symptoms:  Fever: no  Chills/Sweats: YES  Headache (location?): YES - frontal region.   Sinus Pressure: no  Conjunctivitis:  no  Ear Pain: YES- Starting now, left sided.   Rhinorrhea: YES  Congestion: YES  Sore Throat: YES - Still has tonsils, can't see back there.   Cough: YES - Dry  Wheeze: YES  Decreased Appetite: no  Nausea: YES - first two days.   Vomiting: no  Diarrhea: YES - first two days.   Dysuria/Freq.: no  Dysuria or Hematuria: no  Fatigue/Achiness: YES - achy  Sick/Strep Exposure: no  Therapies tried and outcome: Naproxen.      Review of Systems   Constitutional, HEENT, cardiovascular, pulmonary, gi and gu systems are negative, except as otherwise noted.      Objective           Vitals:  No vitals were obtained today due to virtual visit.    Physical Exam   alert and no distress  PSYCH: Alert and oriented times 3; coherent speech, normal   rate and volume, able to articulate logical thoughts, able   to abstract reason, no tangential thoughts, no hallucinations   or delusions  Her affect is normal  RESP: No cough, no audible wheezing, able to talk in full sentences  Remainder of exam unable to be completed due to telephone visits        Phone call duration: 7:36 minutes

## 2021-06-22 LAB
LABORATORY COMMENT REPORT: NORMAL
SARS-COV-2 RNA RESP QL NAA+PROBE: NEGATIVE
SPECIMEN SOURCE: NORMAL

## 2021-07-03 ENCOUNTER — HEALTH MAINTENANCE LETTER (OUTPATIENT)
Age: 41
End: 2021-07-03

## 2021-10-23 ENCOUNTER — HEALTH MAINTENANCE LETTER (OUTPATIENT)
Age: 41
End: 2021-10-23

## 2022-07-30 ENCOUNTER — HEALTH MAINTENANCE LETTER (OUTPATIENT)
Age: 42
End: 2022-07-30

## 2022-10-09 ENCOUNTER — HEALTH MAINTENANCE LETTER (OUTPATIENT)
Age: 42
End: 2022-10-09

## 2023-08-19 ENCOUNTER — HEALTH MAINTENANCE LETTER (OUTPATIENT)
Age: 43
End: 2023-08-19

## 2024-03-16 ENCOUNTER — HEALTH MAINTENANCE LETTER (OUTPATIENT)
Age: 44
End: 2024-03-16

## 2024-04-29 ENCOUNTER — ANCILLARY PROCEDURE (OUTPATIENT)
Dept: GENERAL RADIOLOGY | Facility: OTHER | Age: 44
End: 2024-04-29
Attending: PHYSICIAN ASSISTANT
Payer: COMMERCIAL

## 2024-04-29 ENCOUNTER — OFFICE VISIT (OUTPATIENT)
Dept: FAMILY MEDICINE | Facility: OTHER | Age: 44
End: 2024-04-29
Payer: COMMERCIAL

## 2024-04-29 VITALS
RESPIRATION RATE: 16 BRPM | HEART RATE: 96 BPM | BODY MASS INDEX: 31.73 KG/M2 | WEIGHT: 182 LBS | OXYGEN SATURATION: 97 % | SYSTOLIC BLOOD PRESSURE: 114 MMHG | TEMPERATURE: 98 F | DIASTOLIC BLOOD PRESSURE: 72 MMHG

## 2024-04-29 DIAGNOSIS — Z23 NEED FOR TDAP VACCINATION: ICD-10-CM

## 2024-04-29 DIAGNOSIS — M71.22 POPLITEAL CYST, UNRUPTURED, LEFT: ICD-10-CM

## 2024-04-29 DIAGNOSIS — Z11.59 NEED FOR HEPATITIS C SCREENING TEST: ICD-10-CM

## 2024-04-29 DIAGNOSIS — G43.009 MIGRAINE WITHOUT AURA AND WITHOUT STATUS MIGRAINOSUS, NOT INTRACTABLE: ICD-10-CM

## 2024-04-29 DIAGNOSIS — Z12.4 CERVICAL CANCER SCREENING: ICD-10-CM

## 2024-04-29 DIAGNOSIS — M71.22 POPLITEAL CYST, UNRUPTURED, LEFT: Primary | ICD-10-CM

## 2024-04-29 DIAGNOSIS — H53.131 ACUTE LOSS OF VISION, RIGHT: ICD-10-CM

## 2024-04-29 DIAGNOSIS — Z12.31 VISIT FOR SCREENING MAMMOGRAM: ICD-10-CM

## 2024-04-29 PROCEDURE — 73562 X-RAY EXAM OF KNEE 3: CPT | Mod: TC | Performed by: RADIOLOGY

## 2024-04-29 PROCEDURE — 90471 IMMUNIZATION ADMIN: CPT | Performed by: PHYSICIAN ASSISTANT

## 2024-04-29 PROCEDURE — 90715 TDAP VACCINE 7 YRS/> IM: CPT | Performed by: PHYSICIAN ASSISTANT

## 2024-04-29 PROCEDURE — 99213 OFFICE O/P EST LOW 20 MIN: CPT | Mod: 25 | Performed by: PHYSICIAN ASSISTANT

## 2024-04-29 ASSESSMENT — PAIN SCALES - GENERAL: PAINLEVEL: MODERATE PAIN (4)

## 2024-04-29 NOTE — PROGRESS NOTES
Assessment & Plan     Popliteal cyst, unruptured, left  Palpable cyst behind the left knee.  Will start with an x-ray and have her see orthopedics for further assessment.  - XR Knee Left 3 Views; Future  - Orthopedic  Referral; Future    Migraine without aura and without status migrainosus, not intractable  Acute loss of vision, right - transient  No signs and symptoms related to this today.  Strongly advised that she see her ophthalmologist for further evaluation and treatment options.  She describes a transient hole in her vision that was relieved once the headache resolved.  Do wonder whether or not this is truly related to ocular migraine or if there may be something more sinister present.    Need for Tdap vaccination  - TDAP 10-64Y (ADACEL,BOOSTRIX)    Cervical cancer screening  Need for hepatitis C screening test  Visit for screening mammogram  Other than adding mammography orders today we will defer this to full physical in the near future.  She is not prepared for this today.  - MA Screen Bilateral w/Asad; Future    Subjective   Kirstie is a 43 year old, presenting for the following health issues:  Mass (Left knee/)        4/29/2024    12:45 PM   Additional Questions   Roomed by Medina   Accompanied by Self         4/29/2024    12:45 PM   Patient Reported Additional Medications   Patient reports taking the following new medications beet root, claritan, b12 metabolism support, vitafusion Fiber     Mass    History of Present Illness       Reason for visit:  Lump behind left knee  Symptom onset:  More than a month  Symptoms include:  Comes and goes, gets worse throughout the week from walking.  Symptom intensity:  Moderate  Symptom progression:  Worsening (depends on activity)  What makes it worse:  Walking and activity  What makes it better:  Ice    She eats 2-3 servings of fruits and vegetables daily.She consumes 2 sweetened beverage(s) daily.She exercises with enough effort to increase her heart rate  20 to 29 minutes per day.  She exercises with enough effort to increase her heart rate 4 days per week.   She is taking medications regularly.       Review of Systems  Constitutional, HEENT, cardiovascular, pulmonary, GI, , musculoskeletal, neuro, skin, endocrine and psych systems are negative, except as otherwise noted.      Objective    /72   Pulse 96   Temp 98  F (36.7  C) (Temporal)   Resp 16   Wt 82.6 kg (182 lb)   SpO2 97%   BMI (P) 31.73 kg/m    Body mass index is 31.73 kg/m  (pended).  Physical Exam   GENERAL: alert and no distress  RESP: lungs clear to auscultation - no rales, rhonchi or wheezes  CV: regular rate and rhythm, normal S1 S2, no S3 or S4, no murmur, click or rub, no peripheral edema  MS: no gross musculoskeletal defects noted, no edema, popliteal cyst noted to the left knee.  Range of motion within normal limits.  SKIN: no suspicious lesions or rashes to exposed visible skin today.  NEURO: Normal strength and tone, mentation intact and speech normal  PSYCH: mentation appears normal, affect normal/bright    X-ray: negative for concerning pathology to my independent review today.   It will be overread by radiology.          Signed Electronically by: Curtis Stokes PA-C

## 2024-05-14 NOTE — PROGRESS NOTES
Kirstie Stoll  :  1980  DOS: 5/15/2024  MRN: [id number]  PCP: No Ref-Primary, Physician    Sports Medicine Clinic Visit    HPI  Kirstie Stoll is a 43 year old female who is seen in consultation at the request of  Curtis Austin PA-C presenting with a left knee popliteal cyst.    - Mechanism of Injury:    - No inciting injury  - Pertinent history and prior evaluations:    - 2024 with Curtis Stokes PA-C: Unruptured left popliteal cyst noted that increases in size with prolonged walking  - 2024 left knee xray shows normal anatomic alignment without acute fracture or dislocation, no significant knee joint effusion, no significant degenerative changes.  IMPRESSION: Normal joint spaces and alignment. No fracture.     - Pain Character:    - Location:  left posterior knee  - Character:  burning  - Duration:  4 months  - Course:  worsening  - Endorses:    - increase in swelling with prolonged walking, numbness and tingling in lower left leg  - Denies:    - clicking/popping, grinding, instability, radicular shooting pain, weakness  - Alleviating factors:    - ice  - Aggravating factors:    - prolonged walking, steps, unable to put her foot flat on the ground anymore  - Other treatments tried:    -  ice, knee brace, epsom salt baths    - Patient Goals:    - get a formal diagnosis, discuss treatment options  - Social History:   -  [employer]. Does a lot of biking and walking      Review of Systems  Musculoskeletal: as above  Remainder of review of systems is negative including constitutional, CV, pulmonary, GI, Skin and Neurologic except as noted in HPI or medical history.    Past Medical History:   Diagnosis Date    Vaginal bleeding, abnormal     ongoing since about      Past Surgical History:   Procedure Laterality Date    DILATION AND CURETTAGE, HYSTEROSCOPY DIAGNOSTIC, COMBINED  11/15/2012    Procedure: COMBINED DILATION AND CURETTAGE, HYSTEROSCOPY DIAGNOSTIC;  Hysteroscopy, D&C, polypectomy;  Surgeon:  Joslyn Rosales, ;  Location: [facility] OR    OPEN REDUCTION INTERNAL FIXATION WRIST      right wrist     Family History   Problem Relation Age of Onset    Gynecology Mother         early menopause    Cardiovascular Father         anerysm    Gynecology Sister         early menopause    Osteoporosis Sister          Objective  There were no vitals taken for this visit.    General: healthy, alert and in no acute distress.    HEENT: no scleral icterus or conjunctival erythema.   Skin: no suspicious lesions or rash. No jaundice.   CV: regular rhythm by palpation, 2+ distal pulses.  Resp: normal respiratory effort without conversational dyspnea.   Psych: normal mood and affect.    Gait:  appropriate coordination and balance.  Holds the foot in supination due to too much pain when the foot is flat on the ground     Neuro:        - Sensation to light touch:    - Diminished sensation in the saphenous nerve distribution distal to the knee. Otherwise SILT in all other nerve distributions.        - MSR:      RLE  LLE  - Patella 2+ 2+  - Achilles 2+ 2+       - Special tests:   - Slump/SLR:  Neg    MSK - Knee:       - Inspection:    - Popliteal swelling present without erythema, warmth, ecchymosis, lesion.        - ROM:    - Full AROM/PROM with pain during active dorsiflexion and plantarflexion and inversion       - Palpation:    - TTP at the popliteal space over a palpable cyst, medial gastroc.   - NTTP elsewhere including the distal hamstring tendons, lateral gastroc, Achilles.        - Strength:  (*antalgic)   - Knee Flexion  5-*   - Knee Extension 5   - Dorsiflexion  5-*   - Plantarflexion 5-*   - Ext. Navid. Longus 5   - Inversion  5-*   - Eversion  5        - Special tests:        - Lachman:  Neg        - A/P drawer:  Neg        - Pivot shift:  Neg    - Leif:  Neg     - Varus stress:  Neg for laxity or pain     - Valgus stress:  Neg for laxity or pain    - Patellar grind:  Neg    - Thessaly:  Neg     Radiology  I  independently reviewed the available relevant imaging in the chart with my interpretations as above in HPI.     I independently reviewed today's new relevant imaging, with the following interpretation:  - XR L knee 4/29/2024 shows normal anatomic alignment without significant degenerative changes, no knee joint effusion, no acute fractures or dislocations.  Some soft tissue swelling in the popliteal fossa.  - Duplex ultrasound of the LUE 5/15/2024 shows no evidence of DVT.      Assessment  1. Popliteal cyst, unruptured, left    2. Tibial neuropathy, left        Plan  Kirstie Stoll is a pleasant 43 year old female that presents with subacute posterior left knee pain and swelling.  She saw Curtis Stokes on 4/29/2024 and appeared to have a popliteal cyst in the area and referred to sports medicine for further evaluation and treatment options.  She describes pain in the posterior knee deep to an observable area of swelling that seems to get bigger and smaller depending on her activity level.  She has been feeling this for approximately last 4 months.  There is associated numbness and tingling in the saphenous nerve distribution distal to the knee and she has clinical weakness and tibial innervated muscles below the knee.  Radiographs overall normal.  History and physical exam appear most consistent with a symptomatic Baker's cyst with associated compression of the tibial nerve causing a tibial neuropathy.    She had a positive Homans' sign and was pretty tender in the calf today, so we decided to acutely rule out the possibility of a DVT prior to proceeding with possible Baker's cyst evaluation, aspiration, and corticosteroid injection.  She obtained a duplex LUE ultrasound which showed no presence of DVT.  The technician also did not observe a Baker's cyst during the evaluation.    Medrano's cyst remains the most likely condition based on her history and physical exam, so recommend personal ultrasound evaluation or MRI.   Based on timeline and possibility for other associated injuries, will elect for an MRI of the knee and follow-up afterward to discuss results and next steps that can include aspiration of the Baker's cyst with or without corticosteroid injection.    In the meantime, recommend continuing activities as tolerated, NSAIDs/Tylenol as needed for comfort and pain control, avoiding exacerbating activities, ice the popliteal fossa multiple times per day.  We will follow-up when the results of the MRI are obtained.      Howard Werner DO, BISHNU  [facility]  [facility] - Department of Orthopedic Surgery       Disclaimer:  This note was prepared and written using Dragon Medical dictation software. As a result, there may be errors in the script that have gone undetected. Please consider this when interpreting the information in this note.

## 2024-05-15 ENCOUNTER — HOSPITAL ENCOUNTER (OUTPATIENT)
Dept: ULTRASOUND IMAGING | Facility: CLINIC | Age: 44
Discharge: HOME | End: 2024-05-15
Attending: STUDENT IN AN ORGANIZED HEALTH CARE EDUCATION/TRAINING PROGRAM | Admitting: STUDENT IN AN ORGANIZED HEALTH CARE EDUCATION/TRAINING PROGRAM
Payer: COMMERCIAL

## 2024-05-15 ENCOUNTER — OFFICE VISIT (OUTPATIENT)
Dept: ORTHOPEDICS | Facility: CLINIC | Age: 44
End: 2024-05-15
Attending: PHYSICIAN ASSISTANT
Payer: COMMERCIAL

## 2024-05-15 DIAGNOSIS — M71.22 POPLITEAL CYST, UNRUPTURED, LEFT: Primary | ICD-10-CM

## 2024-05-15 DIAGNOSIS — G57.42 TIBIAL NEUROPATHY, LEFT: ICD-10-CM

## 2024-05-15 DIAGNOSIS — M71.22 POPLITEAL CYST, UNRUPTURED, LEFT: ICD-10-CM

## 2024-05-15 PROCEDURE — 93971 EXTREMITY STUDY: CPT | Mod: LT

## 2024-05-15 PROCEDURE — 99204 OFFICE O/P NEW MOD 45 MIN: CPT | Performed by: STUDENT IN AN ORGANIZED HEALTH CARE EDUCATION/TRAINING PROGRAM

## 2024-05-15 NOTE — LETTER
5/15/2024         RE: Kirstie Stoll  [id number] [facility] [address]        Dear Colleague,    Thank you for referring your patient, Kirstie Stoll, to the [facility] [place]. Please see a copy of my visit note below.    Kirstie Stoll  :  1980  DOS: 5/15/2024  MRN: [id number]  PCP: No Ref-Primary, Physician    Sports Medicine Clinic Visit    HPI  Kirstie Stoll is a 43 year old female who is seen in consultation at the request of  Curtis Austin PA-C presenting with a left knee popliteal cyst.    - Mechanism of Injury:    - No inciting injury  - Pertinent history and prior evaluations:    - 2024 with Curtis Stokes PA-C: Unruptured left popliteal cyst noted that increases in size with prolonged walking  - 2024 left knee xray shows normal anatomic alignment without acute fracture or dislocation, no significant knee joint effusion, no significant degenerative changes.  IMPRESSION: Normal joint spaces and alignment. No fracture.     - Pain Character:    - Location:  left posterior knee  - Character:  burning  - Duration:  4 months  - Course:  worsening  - Endorses:    - increase in swelling with prolonged walking, numbness and tingling in lower left leg  - Denies:    - clicking/popping, grinding, instability, radicular shooting pain, weakness  - Alleviating factors:    - ice  - Aggravating factors:    - prolonged walking, steps, unable to put her foot flat on the ground anymore  - Other treatments tried:    -  ice, knee brace, epsom salt baths    - Patient Goals:    - get a formal diagnosis, discuss treatment options  - Social History:   -  [employer]. Does a lot of biking and walking      Review of Systems  Musculoskeletal: as above  Remainder of review of systems is negative including constitutional, CV, pulmonary, GI, Skin and Neurologic except as noted in HPI or medical history.    Past Medical History:   Diagnosis Date     Vaginal bleeding, abnormal      ongoing since about 2010     Past Surgical History:   Procedure Laterality Date     DILATION AND CURETTAGE, HYSTEROSCOPY DIAGNOSTIC, COMBINED  11/15/2012    Procedure: COMBINED DILATION AND CURETTAGE, HYSTEROSCOPY DIAGNOSTIC;  Hysteroscopy, D&C, polypectomy;  Surgeon: Joslyn Rosales DO;  Location: [facility] OR     OPEN REDUCTION INTERNAL FIXATION WRIST      right wrist     Family History   Problem Relation Age of Onset     Gynecology Mother         early menopause     Cardiovascular Father         anerysm     Gynecology Sister         early menopause     Osteoporosis Sister          Objective  There were no vitals taken for this visit.    General: healthy, alert and in no acute distress.    HEENT: no scleral icterus or conjunctival erythema.   Skin: no suspicious lesions or rash. No jaundice.   CV: regular rhythm by palpation, 2+ distal pulses.  Resp: normal respiratory effort without conversational dyspnea.   Psych: normal mood and affect.    Gait:  appropriate coordination and balance.  Holds the foot in supination due to too much pain when the foot is flat on the ground     Neuro:        - Sensation to light touch:    - Diminished sensation in the saphenous nerve distribution distal to the knee. Otherwise SILT in all other nerve distributions.        - MSR:      RLE  LLE  - Patella 2+ 2+  - Achilles 2+ 2+       - Special tests:   - Slump/SLR:  Neg    MSK - Knee:       - Inspection:    - Popliteal swelling present without erythema, warmth, ecchymosis, lesion.        - ROM:    - Full AROM/PROM with pain during active dorsiflexion and plantarflexion and inversion       - Palpation:    - TTP at the popliteal space over a palpable cyst, medial gastroc.   - NTTP elsewhere including the distal hamstring tendons, lateral gastroc, Achilles.        - Strength:  (*antalgic)   - Knee Flexion  5-*   - Knee Extension 5   - Dorsiflexion  5-*   - Plantarflexion 5-*   - Ext. Navid. Longus 5   - Inversion  5-*   -  Eversion  5        - Special tests:        - Lachman:  Neg        - A/P drawer:  Neg        - Pivot shift:  Neg    - Leif:  Neg     - Varus stress:  Neg for laxity or pain     - Valgus stress:  Neg for laxity or pain    - Patellar grind:  Neg    - Thessaly:  Neg     Radiology  I independently reviewed the available relevant imaging in the chart with my interpretations as above in HPI.     I independently reviewed today's new relevant imaging, with the following interpretation:  - XR L knee 4/29/2024 shows normal anatomic alignment without significant degenerative changes, no knee joint effusion, no acute fractures or dislocations.  Some soft tissue swelling in the popliteal fossa.  - Duplex ultrasound of the LUE 5/15/2024 shows no evidence of DVT.      Assessment  1. Popliteal cyst, unruptured, left    2. Tibial neuropathy, left        Plan  Kirstie Stoll is a pleasant 43 year old female that presents with subacute posterior left knee pain and swelling.  She saw Curtis Stokes on 4/29/2024 and appeared to have a popliteal cyst in the area and referred to sports medicine for further evaluation and treatment options.  She describes pain in the posterior knee deep to an observable area of swelling that seems to get bigger and smaller depending on her activity level.  She has been feeling this for approximately last 4 months.  There is associated numbness and tingling in the saphenous nerve distribution distal to the knee and she has clinical weakness and tibial innervated muscles below the knee.  Radiographs overall normal.  History and physical exam appear most consistent with a symptomatic Baker's cyst with associated compression of the tibial nerve causing a tibial neuropathy.    She had a positive Homans' sign and was pretty tender in the calf today, so we decided to acutely rule out the possibility of a DVT prior to proceeding with possible Baker's cyst evaluation, aspiration, and corticosteroid injection.  She  obtained a duplex LUE ultrasound which showed no presence of DVT.  The technician also did not observe a Baker's cyst during the evaluation.    Medrano's cyst remains the most likely condition based on her history and physical exam, so recommend personal ultrasound evaluation or MRI.  Based on timeline and possibility for other associated injuries, will elect for an MRI of the knee and follow-up afterward to discuss results and next steps that can include aspiration of the Baker's cyst with or without corticosteroid injection.    In the meantime, recommend continuing activities as tolerated, NSAIDs/Tylenol as needed for comfort and pain control, avoiding exacerbating activities, ice the popliteal fossa multiple times per day.  We will follow-up when the results of the MRI are obtained.      Howard Werner DO, CAQSM  [facility]  [facility] - Department of Orthopedic Surgery       Disclaimer:  This note was prepared and written using Dragon Medical dictation software. As a result, there may be errors in the script that have gone undetected. Please consider this when interpreting the information in this note.       Again, thank you for allowing me to participate in the care of your patient.        Sincerely,        Howard Werner DO

## 2024-05-15 NOTE — PATIENT INSTRUCTIONS
MRI Scheduling Instructions  Please follow both steps below    1.  Advanced imaging is done by appointment. Please call [facility] ([facility]/[place]) [phone] to schedule your MRI at your earliest convenience.   - Some insurance companies may require a prior authorization to be completed which can delay the time until you are able to schedule your appointment.     - If you are active on MyChart, you may have access to your test results before your provider is able to review the study and advise on next steps.      2. After the date of your MRI has been scheduled, please call [phone] to get on my schedule for an in-person or telephone follow up appointment to discuss the results and updated treatment recommendations. This follow up should be scheduled for 1-2 days after the date of your MRI.

## 2024-05-22 ENCOUNTER — TELEPHONE (OUTPATIENT)
Dept: ORTHOPEDICS | Facility: CLINIC | Age: 44
End: 2024-05-22
Payer: COMMERCIAL

## 2024-05-22 NOTE — TELEPHONE ENCOUNTER
If the MRI cannot be done then I would recommend a follow-up visit where we can do point-of-care ultrasound and consider an aspiration (scheduled for 6/5/24).  We can then see how much her neuropathy symptoms improve when the cyst is decompressed.  If this does not resolve her symptoms, I can do a peer to peer for the MRI.    Howard Werner DO, RAYMONDM  [facility] - Sports Medicine  [facility] - Department of Orthopedic Surgery

## 2024-05-22 NOTE — TELEPHONE ENCOUNTER
Other: Pt's insurance will not cover the MRI. It would be 1500 out of pocket.  Pt needs to discuss other options.  Please contact her to discuss. Thanks.       Could we send this information to you in [employer] or would you prefer to receive a phone call?:   Patient would prefer a phone call   Okay to leave a detailed message?: Yes at Cell number on file:    Telephone Information:   Mobile [phone]

## 2024-05-23 NOTE — TELEPHONE ENCOUNTER
LVM telling patient to keep 6/5/2024 appointment and we will do ultrasound with possible aspiration and discuss if MRI and/or peer to peer needed at that time. Tabitha Espino, ATC

## 2024-05-31 NOTE — PROGRESS NOTES
Kirstie Stoll  :  1980  DOS: 2024  MRN: [id number]  PCP: No Ref-Primary, Physician    Sports Medicine Clinic Visit    Interim History - 2024  - Last seen on 5/15/2024 for left posterior knee swelling with concern for a symptomatic Baker's cyst causing pain and numbness/tingling in the saphenous nerve distribution distal to the knee as well as clinical weakness in the tibial innervated muscles below the knee.  Decided to order an MRI for evaluation of the Baker's cyst and possible associated compression of the tibial nerve.  MRI was not covered by insurance, so elected to return for an ultrasound evaluation and consideration of aspiration.     - Since the last visit, she has continued to have the same symptoms in the lower leg and posterior knee.  No significant changes.   - No interim injury.       Initial Visit: May 15, 2024  HPI  Kirstie Stoll is a 43 year old female who is seen in consultation at the request of  Curtis Austin PA-C presenting with a left knee popliteal cyst.    - Mechanism of Injury:    - No inciting injury  - Pertinent history and prior evaluations:    - 2024 with Curtis Stokes PA-C: Unruptured left popliteal cyst noted that increases in size with prolonged walking  - 2024 left knee xray shows normal anatomic alignment without acute fracture or dislocation, no significant knee joint effusion, no significant degenerative changes.  IMPRESSION: Normal joint spaces and alignment. No fracture.     - Pain Character:    - Location:  left posterior knee  - Character:  burning  - Duration:  4 months  - Course:  worsening  - Endorses:    - increase in swelling with prolonged walking, numbness and tingling in lower left leg  - Denies:    - clicking/popping, grinding, instability, radicular shooting pain, weakness  - Alleviating factors:    - ice  - Aggravating factors:    - prolonged walking, steps, unable to put her foot flat on the ground anymore  - Other treatments tried:     -  ice, knee brace, epsom salt baths    - Patient Goals:    - get a formal diagnosis, discuss treatment options  - Social History:   -  [employer]. Does a lot of biking and walking      Review of Systems  Musculoskeletal: as above  Remainder of review of systems is negative including constitutional, CV, pulmonary, GI, Skin and Neurologic except as noted in HPI or medical history.    Past Medical History:   Diagnosis Date    Vaginal bleeding, abnormal     ongoing since about 2010     Past Surgical History:   Procedure Laterality Date    DILATION AND CURETTAGE, HYSTEROSCOPY DIAGNOSTIC, COMBINED  11/15/2012    Procedure: COMBINED DILATION AND CURETTAGE, HYSTEROSCOPY DIAGNOSTIC;  Hysteroscopy, D&C, polypectomy;  Surgeon: Joslyn Rosales DO;  Location: [facility] OR    OPEN REDUCTION INTERNAL FIXATION WRIST      right wrist     Family History   Problem Relation Age of Onset    Gynecology Mother         early menopause    Cardiovascular Father         anerysm    Gynecology Sister         early menopause    Osteoporosis Sister          Objective  There were no vitals taken for this visit.    General: healthy, alert and in no acute distress.    HEENT: no scleral icterus or conjunctival erythema.   Skin: no suspicious lesions or rash. No jaundice.   CV: regular rhythm by palpation, 2+ distal pulses.  Resp: normal respiratory effort without conversational dyspnea.   Psych: normal mood and affect.    Gait:  appropriate coordination and balance.  Holds the foot in supination due to too much pain when the foot is flat on the ground     Neuro:        - Sensation to light touch:    - Diminished sensation in the saphenous nerve distribution distal to the knee. Otherwise SILT in all other nerve distributions.        - MSR:      RLE  LLE  - Patella 2+ 2+  - Achilles 2+ 2+       - Special tests:   - Slump/SLR:  Neg    MSK - Knee:       - Inspection:    - Popliteal swelling present without erythema, warmth, ecchymosis, lesion.         - ROM:    - Full AROM/PROM with pain during active dorsiflexion and plantarflexion and inversion       - Palpation:    - TTP at the popliteal space over a palpable cyst, medial gastroc.   - NTTP elsewhere including the distal hamstring tendons, lateral gastroc, Achilles.        - Strength:  (*antalgic)   - Knee Flexion  5-*   - Knee Extension 5   - Dorsiflexion  5-*   - Plantarflexion 5-*   - Ext. Navid. Longus 5   - Inversion  5-*   - Eversion  5        - Special tests:        - Lachman:  Neg        - A/P drawer:  Neg        - Pivot shift:  Neg    - Leif:  Neg     - Varus stress:  Neg for laxity or pain     - Valgus stress:  Neg for laxity or pain    - Patellar grind:  Neg    - Thessaly:  Neg     Radiology  I independently reviewed the available relevant imaging in the chart with my interpretations as above in HPI.   - XR L knee 4/29/2024 shows normal anatomic alignment without significant degenerative changes, no knee joint effusion, no acute fractures or dislocations.  Some soft tissue swelling in the popliteal fossa.  - Duplex ultrasound of the LUE 5/15/2024 shows no evidence of DVT.      PROCEDURE  I performed a limited point-of-care ultrasound of the posterior knee/popliteal fossa to evaluate for a Baker's cyst that could be aspirated.  Visualized the entire popliteal space using a high-frequency linear transducer and no observable Baker's cyst was identified in typical or atypical locations.  Planned aspiration was therefore not performed today.      Assessment  1. Tibial neuropathy, left        Plan  Kirstie Stoll is a pleasant 43 year old female that presents for follow up of her subacute posterior left knee pain and swelling.  She saw Curtis Stokes on 4/29/2024 and appeared to have a popliteal cyst in the area and referred to sports medicine for further evaluation and treatment options.  She describes pain in the posterior knee deep to an observable area of swelling that seems to get bigger and smaller  depending on her activity level.  She has been feeling this for approximately last 4 months.  There is associated numbness and tingling in the saphenous nerve distribution distal to the knee and she has clinical weakness and tibial innervated muscles below the knee.  Radiographs overall normal.  History and physical exam appear most consistent with a symptomatic Baker's cyst with associated compression of the tibial nerve causing a tibial neuropathy.    She had a positive Homans' sign and was pretty tender in the calf at last visit, so we decided to acutely rule out the possibility of a DVT prior to proceeding with possible Baker's cyst evaluation, aspiration, and corticosteroid injection.  She obtained a duplex LUE ultrasound which showed no presence of DVT.  The technician also did not observe a Baker's cyst during the evaluation.    Medrano's cyst still remain the most likely condition based on her history and physical exam so an MRI was ordered to evaluate for possible compression on the tibial nerve and associated injuries.  MRI was denied by insurance and so she presents today for ultrasound evaluation and consideration for Baker's cyst aspiration.    I performed a limited point-of-care ultrasound today to evaluate for a drainable Baker's cyst.  I visualized the entire popliteal space and did not observe a substantial Baker's cyst in typical or atypical locations.  This makes it far less likely that her symptoms are coming from a symptomatic Baker's cyst, so we will need to explore other diagnostic options.  Differential includes possible popliteal artery entrapment syndrome, possible low-grade compartment syndrome, variant musculoskeletal anatomy, organic tibial neuropathy.    An MRI would still be beneficial and I will look into a peer to peer to approve the MRI, otherwise may consider other diagnostic vascular imaging, EMG, or possibly compartment testing.    In the meantime, recommend continuing activities  as tolerated, NSAIDs/Tylenol as needed for comfort and pain control, avoiding exacerbating activities, ice the popliteal fossa multiple times per day.  I will be in contact with her for next steps soon.      Howard Werner DO, BISHNU  [facility] - Department of Orthopedic Surgery       Disclaimer:  This note was prepared and written using Dragon Medical dictation software. As a result, there may be errors in the script that have gone undetected. Please consider this when interpreting the information in this note.

## 2024-06-05 ENCOUNTER — OFFICE VISIT (OUTPATIENT)
Dept: ORTHOPEDICS | Facility: CLINIC | Age: 44
End: 2024-06-05
Payer: COMMERCIAL

## 2024-06-05 DIAGNOSIS — G57.42 TIBIAL NEUROPATHY, LEFT: Primary | ICD-10-CM

## 2024-06-05 PROCEDURE — 76882 US LMTD JT/FCL EVL NVASC XTR: CPT | Mod: LT | Performed by: STUDENT IN AN ORGANIZED HEALTH CARE EDUCATION/TRAINING PROGRAM

## 2024-06-05 PROCEDURE — 99213 OFFICE O/P EST LOW 20 MIN: CPT | Mod: 25 | Performed by: STUDENT IN AN ORGANIZED HEALTH CARE EDUCATION/TRAINING PROGRAM

## 2024-06-05 NOTE — LETTER
2024      Kirstie Stoll  [address] [facility] [address]      Dear Colleague,    Thank you for referring your patient, Kirstie Stoll, to the [facility] [place]. Please see a copy of my visit note below.    Kirstie Stoll  :  1980  DOS: 2024  MRN: [id number]  PCP: No Ref-Primary, Physician    Sports Medicine Clinic Visit    Interim History - 2024  - Last seen on 5/15/2024 for left posterior knee swelling with concern for a symptomatic Baker's cyst causing pain and numbness/tingling in the saphenous nerve distribution distal to the knee as well as clinical weakness in the tibial innervated muscles below the knee.  Decided to order an MRI for evaluation of the Baker's cyst and possible associated compression of the tibial nerve.  MRI was not covered by insurance, so elected to return for an ultrasound evaluation and consideration of aspiration.     - Since the last visit, she has continued to have the same symptoms in the lower leg and posterior knee.  No significant changes.   - No interim injury.       Initial Visit: May 15, 2024  HPI  Kirstie Stoll is a 43 year old female who is seen in consultation at the request of  Curtis Austin PA-C presenting with a left knee popliteal cyst.    - Mechanism of Injury:    - No inciting injury  - Pertinent history and prior evaluations:    - 2024 with Curtis Stokes PA-C: Unruptured left popliteal cyst noted that increases in size with prolonged walking  - 2024 left knee xray shows normal anatomic alignment without acute fracture or dislocation, no significant knee joint effusion, no significant degenerative changes.  IMPRESSION: Normal joint spaces and alignment. No fracture.     - Pain Character:    - Location:  left posterior knee  - Character:  burning  - Duration:  4 months  - Course:  worsening  - Endorses:    - increase in swelling with prolonged walking, numbness and tingling in lower left leg  -  Denies:    - clicking/popping, grinding, instability, radicular shooting pain, weakness  - Alleviating factors:    - ice  - Aggravating factors:    - prolonged walking, steps, unable to put her foot flat on the ground anymore  - Other treatments tried:    -  ice, knee brace, epsom salt baths    - Patient Goals:    - get a formal diagnosis, discuss treatment options  - Social History:   -  [employer]. Does a lot of biking and walking      Review of Systems  Musculoskeletal: as above  Remainder of review of systems is negative including constitutional, CV, pulmonary, GI, Skin and Neurologic except as noted in HPI or medical history.    Past Medical History:   Diagnosis Date     Vaginal bleeding, abnormal     ongoing since about 2010     Past Surgical History:   Procedure Laterality Date     DILATION AND CURETTAGE, HYSTEROSCOPY DIAGNOSTIC, COMBINED  11/15/2012    Procedure: COMBINED DILATION AND CURETTAGE, HYSTEROSCOPY DIAGNOSTIC;  Hysteroscopy, D&C, polypectomy;  Surgeon: Joslyn Rosales DO;  Location: [facility] OR     OPEN REDUCTION INTERNAL FIXATION WRIST      right wrist     Family History   Problem Relation Age of Onset     Gynecology Mother         early menopause     Cardiovascular Father         anerysm     Gynecology Sister         early menopause     Osteoporosis Sister          Objective  There were no vitals taken for this visit.    General: healthy, alert and in no acute distress.    HEENT: no scleral icterus or conjunctival erythema.   Skin: no suspicious lesions or rash. No jaundice.   CV: regular rhythm by palpation, 2+ distal pulses.  Resp: normal respiratory effort without conversational dyspnea.   Psych: normal mood and affect.    Gait:  appropriate coordination and balance.  Holds the foot in supination due to too much pain when the foot is flat on the ground     Neuro:        - Sensation to light touch:    - Diminished sensation in the saphenous nerve distribution distal to the knee. Otherwise  SILT in all other nerve distributions.        - MSR:      RLE  LLE  - Patella 2+ 2+  - Achilles 2+ 2+       - Special tests:   - Slump/SLR:  Neg    MSK - Knee:       - Inspection:    - Popliteal swelling present without erythema, warmth, ecchymosis, lesion.        - ROM:    - Full AROM/PROM with pain during active dorsiflexion and plantarflexion and inversion       - Palpation:    - TTP at the popliteal space over a palpable cyst, medial gastroc.   - NTTP elsewhere including the distal hamstring tendons, lateral gastroc, Achilles.        - Strength:  (*antalgic)   - Knee Flexion  5-*   - Knee Extension 5   - Dorsiflexion  5-*   - Plantarflexion 5-*   - Ext. Navid. Longus 5   - Inversion  5-*   - Eversion  5        - Special tests:        - Lachman:  Neg        - A/P drawer:  Neg        - Pivot shift:  Neg    - Leif:  Neg     - Varus stress:  Neg for laxity or pain     - Valgus stress:  Neg for laxity or pain    - Patellar grind:  Neg    - Thessaly:  Neg     Radiology  I independently reviewed the available relevant imaging in the chart with my interpretations as above in HPI.   - XR L knee 4/29/2024 shows normal anatomic alignment without significant degenerative changes, no knee joint effusion, no acute fractures or dislocations.  Some soft tissue swelling in the popliteal fossa.  - Duplex ultrasound of the LUE 5/15/2024 shows no evidence of DVT.      PROCEDURE  I performed a limited point-of-care ultrasound of the posterior knee/popliteal fossa to evaluate for a Baker's cyst that could be aspirated.  Visualized the entire popliteal space using a high-frequency linear transducer and no observable Baker's cyst was identified in typical or atypical locations.  Planned aspiration was therefore not performed today.      Assessment  1. Tibial neuropathy, left        Plan  Kirstie Stoll is a pleasant 43 year old female that presents for follow up of her subacute posterior left knee pain and swelling.  She saw Curtis  Divya on 4/29/2024 and appeared to have a popliteal cyst in the area and referred to sports medicine for further evaluation and treatment options.  She describes pain in the posterior knee deep to an observable area of swelling that seems to get bigger and smaller depending on her activity level.  She has been feeling this for approximately last 4 months.  There is associated numbness and tingling in the saphenous nerve distribution distal to the knee and she has clinical weakness and tibial innervated muscles below the knee.  Radiographs overall normal.  History and physical exam appear most consistent with a symptomatic Baker's cyst with associated compression of the tibial nerve causing a tibial neuropathy.    She had a positive Homans' sign and was pretty tender in the calf at last visit, so we decided to acutely rule out the possibility of a DVT prior to proceeding with possible Baker's cyst evaluation, aspiration, and corticosteroid injection.  She obtained a duplex LUE ultrasound which showed no presence of DVT.  The technician also did not observe a Baker's cyst during the evaluation.    Medrano's cyst still remain the most likely condition based on her history and physical exam so an MRI was ordered to evaluate for possible compression on the tibial nerve and associated injuries.  MRI was denied by insurance and so she presents today for ultrasound evaluation and consideration for Baker's cyst aspiration.    I performed a limited point-of-care ultrasound today to evaluate for a drainable Baker's cyst.  I visualized the entire popliteal space and did not observe a substantial Baker's cyst in typical or atypical locations.  This makes it far less likely that her symptoms are coming from a symptomatic Baker's cyst, so we will need to explore other diagnostic options.  Differential includes possible popliteal artery entrapment syndrome, possible low-grade compartment syndrome, variant musculoskeletal anatomy,  organic tibial neuropathy.    An MRI would still be beneficial and I will look into a peer to peer to approve the MRI, otherwise may consider other diagnostic vascular imaging, EMG, or possibly compartment testing.    In the meantime, recommend continuing activities as tolerated, NSAIDs/Tylenol as needed for comfort and pain control, avoiding exacerbating activities, ice the popliteal fossa multiple times per day.  I will be in contact with her for next steps soon.      Howard Werner DO, CAQSM  [facility] Sports Medicine  [facility] - Department of Orthopedic Surgery       Disclaimer:  This note was prepared and written using Dragon Medical dictation software. As a result, there may be errors in the script that have gone undetected. Please consider this when interpreting the information in this note.       Again, thank you for allowing me to participate in the care of your patient.        Sincerely,        Howard Werner DO

## 2024-06-12 ENCOUNTER — TELEPHONE (OUTPATIENT)
Dept: ORTHOPEDICS | Facility: CLINIC | Age: 44
End: 2024-06-12
Payer: COMMERCIAL

## 2024-06-13 ENCOUNTER — TELEPHONE (OUTPATIENT)
Dept: ORTHOPEDICS | Facility: CLINIC | Age: 44
End: 2024-06-13
Payer: COMMERCIAL

## 2024-06-13 DIAGNOSIS — G57.42 TIBIAL NEUROPATHY, LEFT: Primary | ICD-10-CM

## 2024-06-13 DIAGNOSIS — I77.89 POPLITEAL ARTERY ENTRAPMENT SYNDROME (H): ICD-10-CM

## 2024-06-13 NOTE — TELEPHONE ENCOUNTER
Looked into available options for advanced imaging and testing of the posterior knee pain with paresthesias and weakness in the tibial nerve distribution.  My primary concern is for abnormal anatomy that may be compressing on the tibial nerve and possibly the popliteal artery.  The best test to start with is an MRI of the knee to look for that abnormal anatomy.  This will hopefully give us the information we need to determine the next steps.  There may be a possibility of vascular imaging such as an MR angiogram, ABIs, etc. that may be needed after that but we may get enough information from a typical MRI of the knee instead.    I would recommend proceeding with the MRI as the right next step, and she may be connected with the financial counselor still see if cost can be managed successfully.    Changed the current MRI to an MRI with contrast.

## 2024-06-14 NOTE — TELEPHONE ENCOUNTER
Spoke with patient and explained that since MRA is not denied, it is just not a covered service, there was not much we could do to appeal the decision. Given the financial team number to call and discuss different options for payment for the MRA. Number given was [phone]. Tabitha Espino, ATC

## 2024-07-03 ENCOUNTER — HOSPITAL ENCOUNTER (OUTPATIENT)
Dept: MRI IMAGING | Facility: CLINIC | Age: 44
Discharge: HOME | End: 2024-07-03
Attending: STUDENT IN AN ORGANIZED HEALTH CARE EDUCATION/TRAINING PROGRAM | Admitting: STUDENT IN AN ORGANIZED HEALTH CARE EDUCATION/TRAINING PROGRAM
Payer: COMMERCIAL

## 2024-07-03 DIAGNOSIS — G57.42 TIBIAL NEUROPATHY, LEFT: ICD-10-CM

## 2024-07-03 DIAGNOSIS — I77.89 POPLITEAL ARTERY ENTRAPMENT SYNDROME (H): ICD-10-CM

## 2024-07-03 PROCEDURE — 73723 MRI JOINT LWR EXTR W/O&W/DYE: CPT | Mod: LT

## 2024-07-03 PROCEDURE — 73723 MRI JOINT LWR EXTR W/O&W/DYE: CPT | Mod: 26 | Performed by: RADIOLOGY

## 2024-07-03 PROCEDURE — 255N000002 HC RX 255 OP 636: Performed by: STUDENT IN AN ORGANIZED HEALTH CARE EDUCATION/TRAINING PROGRAM

## 2024-07-03 PROCEDURE — A9585 GADOBUTROL INJECTION: HCPCS | Performed by: STUDENT IN AN ORGANIZED HEALTH CARE EDUCATION/TRAINING PROGRAM

## 2024-07-03 RX ORDER — GADOBUTROL 604.72 MG/ML
10 INJECTION INTRAVENOUS ONCE
Status: COMPLETED | OUTPATIENT
Start: 2024-07-03 | End: 2024-07-03

## 2024-07-03 RX ADMIN — GADOBUTROL 8.5 ML: 604.72 INJECTION INTRAVENOUS at 16:34

## 2024-07-11 ENCOUNTER — TELEPHONE (OUTPATIENT)
Dept: ORTHOPEDICS | Facility: CLINIC | Age: 44
End: 2024-07-11
Payer: COMMERCIAL

## 2024-07-11 NOTE — TELEPHONE ENCOUNTER
MR left knee without and with contrast     Techniques: Multiplanar multisequence imaging of the left knee with  and without IV contrast. Contrast: 8.5 mL Gadavist     History: Posterior knee pain and swelling with associated tibial nerve  paresthesias and weakness with increased activity and prolonged  standing.  Considered for popliteal artery entrapment syndrome, tibial  nerve entrapment.; Tibial neuropathy, left; Popliteal artery  entrapment syndrome (H24)      Comparison: Radiographs 4/29/2024     Findings:     MENISCI:  Medial meniscus: Intact.  Lateral meniscus: Intact.     LIGAMENTS  Cruciate ligaments: Intact.  Medial supporting structures: Intact.  Lateral supporting structures: Intact.     EXTENSOR MECHANISM  Intact. Edema in the superolateral aspect of Hoffa's fat pad.     FLUID  No joint effusion. No substantial Baker's cyst.     OSSEOUS and ARTICULAR STRUCTURES  Bones: No fracture, contusion, or osseous lesion is seen.     Patellofemoral compartment: Full-thickness fissuring of cartilage over  the medial facet and median ridge of the patella with subchondral  cystic change.     Medial compartment: No hyaline cartilage disease.     Lateral compartment: Focal full-thickness fissuring of cartilage over  the lateral tibial plateau with subchondral cysts.     ANCILLARY FINDINGS  Normal appearance and course of the popliteal artery. Normal  appearance and course of the tibial nerve. Mild nonspecific edema  within the medial gastrocnemius and soleus muscle bellies, likely  delayed onset muscle soreness.                                                                      Impression:     1. No evidence of popliteal artery or tibial nerve entrapment.     2. Focal areas of grade IV chondromalacia over the patella and lateral  tibial plateau.      3. No evidence of internal derangement. Intact menisci, cruciate  ligaments, and medial and lateral supporting structures.      4. Edema in the superolateral aspect of  Hoffa's fat pad as can be seen  in patellar tendon-lateral femoral condyle friction syndrome.      I have personally reviewed the examination and initial interpretation  and I agree with the findings.     ABEL Gonzalez) HOLLY PINO MD

## 2024-07-11 NOTE — TELEPHONE ENCOUNTER
[facility] Call Center    Phone Message    May a detailed message be left on voicemail: yes     Reason for Call: Other: pt calling as she would like to know if she will be getting a phone call with her MRI results?      Action Taken: Other: te    Travel Screening: Not Applicable     Date of Service:

## 2024-07-12 ENCOUNTER — VIRTUAL VISIT (OUTPATIENT)
Dept: ORTHOPEDICS | Facility: CLINIC | Age: 44
End: 2024-07-12
Payer: COMMERCIAL

## 2024-07-12 DIAGNOSIS — M17.12 PRIMARY OSTEOARTHRITIS OF LEFT KNEE: ICD-10-CM

## 2024-07-12 DIAGNOSIS — G57.42 TIBIAL NEUROPATHY, LEFT: Primary | ICD-10-CM

## 2024-07-12 PROCEDURE — 99442 PR PHYSICIAN TELEPHONE EVALUATION 11-20 MIN: CPT | Mod: 93 | Performed by: STUDENT IN AN ORGANIZED HEALTH CARE EDUCATION/TRAINING PROGRAM

## 2024-07-12 NOTE — TELEPHONE ENCOUNTER
ERIC HUSSEIN for patient asking for a return call to [phone] if she is available for a telephone visit with Dr. Werner today at 9:00am to review her recent left knee MRI results.    ERIC Roberts

## 2024-07-12 NOTE — LETTER
2024      Kirstie Stoll  [address] [facility] [address]      Dear Colleague,    Thank you for referring your patient, Kirstie Stoll, to the [facility] [place]. Please see a copy of my visit note below.    Kirstie is a 43 year old who is being evaluated via a billable telephone visit.    What phone number would you like to be contacted at? [phone]  How would you like to obtain your AVS? MyChart  Originating Location (pt. Location): Home    Distant Location (provider location):  On-site  Phone call duration: 21 minutes       Kirstie Stoll  :  1980  DOS: 2024  MRN: [id number]  PCP: No Ref-Primary, Physician    Sports Medicine Clinic Visit      Interim History - 2024  - Last seen on 2024 for chronic left posterior knee pain with swelling and tibial neuropathy symptoms.  See previous encounters for details.  She recently underwent an MRI for evaluation of structural etiologies of her pain and tibial neuropathy symptoms.    - MR L knee 7/3/2024 shows a small amount of fluid around the proximal medial gastroc and distal semimembranosus tendons in the area of the Baker's cyst.  No major compression or abnormality of the tibial nerve or popliteal artery.  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    - Since the last visit, there has been no major change in her symptoms.  She did note that laying in the MRI machine caused her pain in the knee and feeling of deadness in the leg worse.   - No interim injury.       Interim History - 2024  - Last seen on 5/15/2024 for left posterior knee swelling with concern for a symptomatic Baker's cyst causing pain and numbness/tingling in the saphenous nerve distribution distal to the knee as well as clinical weakness in the tibial innervated muscles below the knee.  Decided to order an MRI for evaluation of the Baker's cyst and possible associated  compression of the tibial nerve.  MRI was not covered by insurance, so elected to return for an ultrasound evaluation and consideration of aspiration.     - Since the last visit, she has continued to have the same symptoms in the lower leg and posterior knee.  No significant changes.   - No interim injury.       Initial Visit: May 15, 2024  HPI  Kirstie Stoll is a 43 year old female who is seen in consultation at the request of  Curtis Austin PA-C presenting with a left knee popliteal cyst.    - Mechanism of Injury:    - No inciting injury  - Pertinent history and prior evaluations:    - 4/29/2024 with Curtis Stokes PA-C: Unruptured left popliteal cyst noted that increases in size with prolonged walking  - 4/29/2024 left knee xray shows normal anatomic alignment without acute fracture or dislocation, no significant knee joint effusion, no significant degenerative changes.  IMPRESSION: Normal joint spaces and alignment. No fracture.     - Pain Character:    - Location:  left posterior knee  - Character:  burning  - Duration:  4 months  - Course:  worsening  - Endorses:    - increase in swelling with prolonged walking, numbness and tingling in lower left leg  - Denies:    - clicking/popping, grinding, instability, radicular shooting pain, weakness  - Alleviating factors:    - ice  - Aggravating factors:    - prolonged walking, steps, unable to put her foot flat on the ground anymore  - Other treatments tried:    -  ice, knee brace, epsom salt baths    - Patient Goals:    - get a formal diagnosis, discuss treatment options  - Social History:   -  [employer]. Does a lot of biking and walking      Review of Systems  Musculoskeletal: as above  Remainder of review of systems is negative including constitutional, CV, pulmonary, GI, Skin and Neurologic except as noted in HPI or medical history.    Past Medical History:   Diagnosis Date     Vaginal bleeding, abnormal     ongoing since about 2010     Past Surgical History:    Procedure Laterality Date     DILATION AND CURETTAGE, HYSTEROSCOPY DIAGNOSTIC, COMBINED  11/15/2012    Procedure: COMBINED DILATION AND CURETTAGE, HYSTEROSCOPY DIAGNOSTIC;  Hysteroscopy, D&C, polypectomy;  Surgeon: Joslyn Rosales DO;  Location: [facility] OR     OPEN REDUCTION INTERNAL FIXATION WRIST      right wrist     Family History   Problem Relation Age of Onset     Gynecology Mother         early menopause     Cardiovascular Father         anerysm     Gynecology Sister         early menopause     Osteoporosis Sister          Objective  No physical exam due to the nature of the telephone visit.    Radiology  I independently reviewed the available relevant imaging in the chart with my interpretations as above in HPI.   - XR L knee 4/29/2024 shows normal anatomic alignment without significant degenerative changes, no knee joint effusion, no acute fractures or dislocations.  Some soft tissue swelling in the popliteal fossa.  - Duplex ultrasound of the LUE 5/15/2024 shows no evidence of DVT.  - MR L knee 7/3/2024 shows a small amount of fluid around the proximal medial gastroc and distal semimembranosus tendons in the area of the Baker's cyst.  No major compression or abnormality of the tibial nerve or popliteal artery.  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      Assessment  1. Tibial neuropathy, left    2. Primary osteoarthritis of left knee          Plan  Kirstie Stoll is a pleasant 43 year old female that presents for follow up of her subacute posterior left knee pain and swelling.  She saw Curtis Stokes on 4/29/2024 and appeared to have a popliteal cyst in the area and referred to sports medicine for further evaluation and treatment options.  She describes pain in the posterior knee deep to an observable area of swelling that seems to get bigger and smaller depending on her activity level.  She has been feeling this for approximately last 4 months.  There  has associated numbness and tingling in the saphenous nerve distribution distal to the knee and she has clinical weakness and tibial innervated muscles below the knee.  Radiographs overall normal.  History and physical exam appear most consistent with a symptomatic Baker's cyst with associated compression of the tibial nerve causing a tibial neuropathy.    She had a positive Homans' sign and was pretty tender in the calf at last visit, so we decided to acutely rule out the possibility of a DVT prior to proceeding with possible Baker's cyst evaluation, aspiration, and corticosteroid injection.  She obtained a duplex LUE ultrasound which showed no presence of DVT.  The technician also did not observe a Baker's cyst during the evaluation.    Medrano's cyst still remained the most likely condition based on her history and physical exam so an MRI was ordered to evaluate for possible compression on the tibial nerve and associated injuries.  MRI was denied by insurance and so she presented to a past visit for ultrasound evaluation and consideration for Baker's cyst aspiration.    I performed a limited point-of-care ultrasound at that visit to evaluate for a drainable Baker's cyst.  I visualized the entire popliteal space and did not observe a substantial Baker's cyst in typical or atypical locations.  Concern is raised for either a deep swelling within the popliteal fossa or other causes of posterior knee pain and tibial neuropathy symptoms.  Differential includes possible popliteal artery entrapment syndrome, possible low-grade compartment syndrome, variant musculoskeletal anatomy, organic tibial neuropathy.    MRI was performed that showed some deep fluid at the proximal medial gastroc/distal semimembranosus tendons and an area of a deep Baker's cyst.  Also showed grade IV chondromalacia, worse in the patellofemoral and lateral compartments.    In some cases, intra-articular joint cartilage damage can present with  posterior knee pain and swelling, which may dynamically be compressing on the tibial nerve even though no permanent obstruction or damage was evident on the MRI.    Discussed MRI findings and will be doing a left knee corticosteroid injection on 7/17/2024 and we will see how much this improves her knee pain and neuropathy symptoms.  She did say that lying supine in the MRI machine made her pain and numbness/dead feeling worse in the lower extremity.  She occasionally feels some proximal lower extremity numbness intermittently when she wakes up in the morning.  She has also been feeling like her hands go numb over the past month, unsure if it seems related to the leg or not.  Right leg is not involved.  Plan will be to do a CSI on the left knee and see how much improvement she gets, depending on the results of the injection we may consider neurologic workup for the LLE and BUE numbness.    Future considerations may be other diagnostic vascular imaging, EMG, or possibly compartment testing. In the meantime, recommend continuing activities as tolerated, NSAIDs/Tylenol as needed for comfort and pain control, avoiding exacerbating activities, ice the popliteal fossa multiple times per day.  She will be scheduled for a left intra-articular knee joint injection next week.      Howard Werner DO, CAQSM  [facility] Sports Medicine  [facility] - Department of Orthopedic Surgery       Disclaimer:  This note was prepared and written using Dragon Medical dictation software. As a result, there may be errors in the script that have gone undetected. Please consider this when interpreting the information in this note.          Again, thank you for allowing me to participate in the care of your patient.        Sincerely,        Howard Werner DO

## 2024-07-12 NOTE — LETTER
2024      Kirstie Stoll  [address] [facility] [address]      Dear Colleague,    Thank you for referring your patient, Kirstie Stoll, to the [facility] [place]. Please see a copy of my visit note below.    Kirstie is a 43 year old who is being evaluated via a billable telephone visit.    What phone number would you like to be contacted at? [phone]  How would you like to obtain your AVS? MyChart  Originating Location (pt. Location): Home    Distant Location (provider location):  On-site  Phone call duration: 21 minutes       Kirstie Stoll  :  1980  DOS: 2024  MRN: [id number]  PCP: No Ref-Primary, Physician    Sports Medicine Clinic Visit      Interim History - 2024  - Last seen on 2024 for chronic left posterior knee pain with swelling and tibial neuropathy symptoms.  See previous encounters for details.  She recently underwent an MRI for evaluation of structural etiologies of her pain and tibial neuropathy symptoms.    - MR L knee 7/3/2024 shows a small amount of fluid around the proximal medial gastroc and distal semimembranosus tendons in the area of the Baker's cyst.  No major compression or abnormality of the tibial nerve or popliteal artery.  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    - Since the last visit, there has been no major change in her symptoms.  She did note that laying in the MRI machine caused her pain in the knee and feeling of deadness in the leg worse.   - No interim injury.       Interim History - 2024  - Last seen on 5/15/2024 for left posterior knee swelling with concern for a symptomatic Baker's cyst causing pain and numbness/tingling in the saphenous nerve distribution distal to the knee as well as clinical weakness in the tibial innervated muscles below the knee.  Decided to order an MRI for evaluation of the Baker's cyst and possible associated  compression of the tibial nerve.  MRI was not covered by insurance, so elected to return for an ultrasound evaluation and consideration of aspiration.     - Since the last visit, she has continued to have the same symptoms in the lower leg and posterior knee.  No significant changes.   - No interim injury.       Initial Visit: May 15, 2024  HPI  Kirstie Stoll is a 43 year old female who is seen in consultation at the request of  Curtis Austin PA-C presenting with a left knee popliteal cyst.    - Mechanism of Injury:    - No inciting injury  - Pertinent history and prior evaluations:    - 4/29/2024 with Curtis Stokes PA-C: Unruptured left popliteal cyst noted that increases in size with prolonged walking  - 4/29/2024 left knee xray shows normal anatomic alignment without acute fracture or dislocation, no significant knee joint effusion, no significant degenerative changes.  IMPRESSION: Normal joint spaces and alignment. No fracture.     - Pain Character:    - Location:  left posterior knee  - Character:  burning  - Duration:  4 months  - Course:  worsening  - Endorses:    - increase in swelling with prolonged walking, numbness and tingling in lower left leg  - Denies:    - clicking/popping, grinding, instability, radicular shooting pain, weakness  - Alleviating factors:    - ice  - Aggravating factors:    - prolonged walking, steps, unable to put her foot flat on the ground anymore  - Other treatments tried:    -  ice, knee brace, epsom salt baths    - Patient Goals:    - get a formal diagnosis, discuss treatment options  - Social History:   -  [employer]. Does a lot of biking and walking      Review of Systems  Musculoskeletal: as above  Remainder of review of systems is negative including constitutional, CV, pulmonary, GI, Skin and Neurologic except as noted in HPI or medical history.    Past Medical History:   Diagnosis Date     Vaginal bleeding, abnormal     ongoing since about 2010     Past Surgical History:    Procedure Laterality Date     DILATION AND CURETTAGE, HYSTEROSCOPY DIAGNOSTIC, COMBINED  11/15/2012    Procedure: COMBINED DILATION AND CURETTAGE, HYSTEROSCOPY DIAGNOSTIC;  Hysteroscopy, D&C, polypectomy;  Surgeon: Joslyn Rosales DO;  Location: [facility] OR     OPEN REDUCTION INTERNAL FIXATION WRIST      right wrist     Family History   Problem Relation Age of Onset     Gynecology Mother         early menopause     Cardiovascular Father         anerysm     Gynecology Sister         early menopause     Osteoporosis Sister          Objective  No physical exam due to the nature of the telephone visit.    Radiology  I independently reviewed the available relevant imaging in the chart with my interpretations as above in HPI.   - XR L knee 4/29/2024 shows normal anatomic alignment without significant degenerative changes, no knee joint effusion, no acute fractures or dislocations.  Some soft tissue swelling in the popliteal fossa.  - Duplex ultrasound of the LUE 5/15/2024 shows no evidence of DVT.  - MR L knee 7/3/2024 shows a small amount of fluid around the proximal medial gastroc and distal semimembranosus tendons in the area of the Baker's cyst.  No major compression or abnormality of the tibial nerve or popliteal artery.  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      Assessment  1. Tibial neuropathy, left    2. Primary osteoarthritis of left knee          Plan  Kirstie Stoll is a pleasant 43 year old female that presents for follow up of her subacute posterior left knee pain and swelling.  She saw Curtis Stokes on 4/29/2024 and appeared to have a popliteal cyst in the area and referred to sports medicine for further evaluation and treatment options.  She describes pain in the posterior knee deep to an observable area of swelling that seems to get bigger and smaller depending on her activity level.  She has been feeling this for approximately last 4 months.  There  has associated numbness and tingling in the saphenous nerve distribution distal to the knee and she has clinical weakness and tibial innervated muscles below the knee.  Radiographs overall normal.  History and physical exam appear most consistent with a symptomatic Baker's cyst with associated compression of the tibial nerve causing a tibial neuropathy.    She had a positive Homans' sign and was pretty tender in the calf at last visit, so we decided to acutely rule out the possibility of a DVT prior to proceeding with possible Baker's cyst evaluation, aspiration, and corticosteroid injection.  She obtained a duplex LUE ultrasound which showed no presence of DVT.  The technician also did not observe a Baker's cyst during the evaluation.    Medrano's cyst still remained the most likely condition based on her history and physical exam so an MRI was ordered to evaluate for possible compression on the tibial nerve and associated injuries.  MRI was denied by insurance and so she presented to a past visit for ultrasound evaluation and consideration for Baker's cyst aspiration.    I performed a limited point-of-care ultrasound at that visit to evaluate for a drainable Baker's cyst.  I visualized the entire popliteal space and did not observe a substantial Baker's cyst in typical or atypical locations.  Concern is raised for either a deep swelling within the popliteal fossa or other causes of posterior knee pain and tibial neuropathy symptoms.  Differential includes possible popliteal artery entrapment syndrome, possible low-grade compartment syndrome, variant musculoskeletal anatomy, organic tibial neuropathy.    MRI was performed that showed some deep fluid at the proximal medial gastroc/distal semimembranosus tendons and an area of a deep Baker's cyst.  Also showed grade IV chondromalacia, worse in the patellofemoral and lateral compartments.    In some cases, intra-articular joint cartilage damage can present with  posterior knee pain and swelling, which may dynamically be compressing on the tibial nerve even though no permanent obstruction or damage was evident on the MRI.    Discussed MRI findings and will be doing a left knee corticosteroid injection on 7/17/2024 and we will see how much this improves her knee pain and neuropathy symptoms.  She did say that lying supine in the MRI machine made her pain and numbness/dead feeling worse in the lower extremity.  She occasionally feels some proximal lower extremity numbness intermittently when she wakes up in the morning.  She has also been feeling like her hands go numb over the past month, unsure if it seems related to the leg or not.  Right leg is not involved.  Plan will be to do a CSI on the left knee and see how much improvement she gets, depending on the results of the injection we may consider neurologic workup for the LLE and BUE numbness.    Future considerations may be other diagnostic vascular imaging, EMG, or possibly compartment testing. In the meantime, recommend continuing activities as tolerated, NSAIDs/Tylenol as needed for comfort and pain control, avoiding exacerbating activities, ice the popliteal fossa multiple times per day.  She will be scheduled for a left intra-articular knee joint injection next week.      Howard Werner DO, CAQSM  [facility] Sports Medicine  [facility] - Department of Orthopedic Surgery       Disclaimer:  This note was prepared and written using Dragon Medical dictation software. As a result, there may be errors in the script that have gone undetected. Please consider this when interpreting the information in this note.          Again, thank you for allowing me to participate in the care of your patient.        Sincerely,        Howard Werner DO

## 2024-07-12 NOTE — PROGRESS NOTES
Kirstie is a 43 year old who is being evaluated via a billable telephone visit.    What phone number would you like to be contacted at? [phone]  How would you like to obtain your AVS? [place]  Originating Location (pt. Location): Home    Distant Location (provider location):  On-site  Phone call duration: 21 minutes       Kirstie Stoll  :  1980  DOS: 2024  MRN: [id number]  PCP: No Ref-Primary, Physician    Sports Medicine Clinic Visit      Interim History - 2024  - Last seen on 2024 for chronic left posterior knee pain with swelling and tibial neuropathy symptoms.  See previous encounters for details.  She recently underwent an MRI for evaluation of structural etiologies of her pain and tibial neuropathy symptoms.    - MR L knee 7/3/2024 shows a small amount of fluid around the proximal medial gastroc and distal semimembranosus tendons in the area of the Baker's cyst.  No major compression or abnormality of the tibial nerve or popliteal artery.  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    - Since the last visit, there has been no major change in her symptoms.  She did note that laying in the MRI machine caused her pain in the knee and feeling of deadness in the leg worse.   - No interim injury.       Interim History - 2024  - Last seen on 5/15/2024 for left posterior knee swelling with concern for a symptomatic Baker's cyst causing pain and numbness/tingling in the saphenous nerve distribution distal to the knee as well as clinical weakness in the tibial innervated muscles below the knee.  Decided to order an MRI for evaluation of the Baker's cyst and possible associated compression of the tibial nerve.  MRI was not covered by insurance, so elected to return for an ultrasound evaluation and consideration of aspiration.     - Since the last visit, she has continued to have the same symptoms in the lower leg and posterior knee.  No  significant changes.   - No interim injury.       Initial Visit: May 15, 2024  HPI  Kirstie Stoll is a 43 year old female who is seen in consultation at the request of  Curtis Austin PA-C presenting with a left knee popliteal cyst.    - Mechanism of Injury:    - No inciting injury  - Pertinent history and prior evaluations:    - 4/29/2024 with Curtis Stokes PA-C: Unruptured left popliteal cyst noted that increases in size with prolonged walking  - 4/29/2024 left knee xray shows normal anatomic alignment without acute fracture or dislocation, no significant knee joint effusion, no significant degenerative changes.  IMPRESSION: Normal joint spaces and alignment. No fracture.     - Pain Character:    - Location:  left posterior knee  - Character:  burning  - Duration:  4 months  - Course:  worsening  - Endorses:    - increase in swelling with prolonged walking, numbness and tingling in lower left leg  - Denies:    - clicking/popping, grinding, instability, radicular shooting pain, weakness  - Alleviating factors:    - ice  - Aggravating factors:    - prolonged walking, steps, unable to put her foot flat on the ground anymore  - Other treatments tried:    -  ice, knee brace, epsom salt baths    - Patient Goals:    - get a formal diagnosis, discuss treatment options  - Social History:   -  [employer]. Does a lot of biking and walking      Review of Systems  Musculoskeletal: as above  Remainder of review of systems is negative including constitutional, CV, pulmonary, GI, Skin and Neurologic except as noted in HPI or medical history.    Past Medical History:   Diagnosis Date    Vaginal bleeding, abnormal     ongoing since about 2010     Past Surgical History:   Procedure Laterality Date    DILATION AND CURETTAGE, HYSTEROSCOPY DIAGNOSTIC, COMBINED  11/15/2012    Procedure: COMBINED DILATION AND CURETTAGE, HYSTEROSCOPY DIAGNOSTIC;  Hysteroscopy, D&C, polypectomy;  Surgeon: Joslyn Rosales DO;  Location:  OR     OPEN REDUCTION INTERNAL FIXATION WRIST      right wrist     Family History   Problem Relation Age of Onset    Gynecology Mother         early menopause    Cardiovascular Father         anerysm    Gynecology Sister         early menopause    Osteoporosis Sister          Objective  No physical exam due to the nature of the telephone visit.    Radiology  I independently reviewed the available relevant imaging in the chart with my interpretations as above in HPI.   - XR L knee 4/29/2024 shows normal anatomic alignment without significant degenerative changes, no knee joint effusion, no acute fractures or dislocations.  Some soft tissue swelling in the popliteal fossa.  - Duplex ultrasound of the LUE 5/15/2024 shows no evidence of DVT.  - MR L knee 7/3/2024 shows a small amount of fluid around the proximal medial gastroc and distal semimembranosus tendons in the area of the Baker's cyst.  No major compression or abnormality of the tibial nerve or popliteal artery.  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      Assessment  1. Tibial neuropathy, left    2. Primary osteoarthritis of left knee          Jaciel Stoll is a pleasant 43 year old female that presents for follow up of her subacute posterior left knee pain and swelling.  She saw Curtis Stokes on 4/29/2024 and appeared to have a popliteal cyst in the area and referred to sports medicine for further evaluation and treatment options.  She describes pain in the posterior knee deep to an observable area of swelling that seems to get bigger and smaller depending on her activity level.  She has been feeling this for approximately last 4 months.  There has associated numbness and tingling in the saphenous nerve distribution distal to the knee and she has clinical weakness and tibial innervated muscles below the knee.  Radiographs overall normal.  History and physical exam appear most consistent with a symptomatic Baker's  cyst with associated compression of the tibial nerve causing a tibial neuropathy.    She had a positive Homans' sign and was pretty tender in the calf at last visit, so we decided to acutely rule out the possibility of a DVT prior to proceeding with possible Baker's cyst evaluation, aspiration, and corticosteroid injection.  She obtained a duplex LUE ultrasound which showed no presence of DVT.  The technician also did not observe a Baker's cyst during the evaluation.    Medrano's cyst still remained the most likely condition based on her history and physical exam so an MRI was ordered to evaluate for possible compression on the tibial nerve and associated injuries.  MRI was denied by insurance and so she presented to a past visit for ultrasound evaluation and consideration for Baker's cyst aspiration.    I performed a limited point-of-care ultrasound at that visit to evaluate for a drainable Baker's cyst.  I visualized the entire popliteal space and did not observe a substantial Baker's cyst in typical or atypical locations.  Concern is raised for either a deep swelling within the popliteal fossa or other causes of posterior knee pain and tibial neuropathy symptoms.  Differential includes possible popliteal artery entrapment syndrome, possible low-grade compartment syndrome, variant musculoskeletal anatomy, organic tibial neuropathy.    MRI was performed that showed some deep fluid at the proximal medial gastroc/distal semimembranosus tendons and an area of a deep Baker's cyst.  Also showed grade IV chondromalacia, worse in the patellofemoral and lateral compartments.    In some cases, intra-articular joint cartilage damage can present with posterior knee pain and swelling, which may dynamically be compressing on the tibial nerve even though no permanent obstruction or damage was evident on the MRI.    Discussed MRI findings and will be doing a left knee corticosteroid injection on 7/17/2024 and we will see how much  this improves her knee pain and neuropathy symptoms.  She did say that lying supine in the MRI machine made her pain and numbness/dead feeling worse in the lower extremity.  She occasionally feels some proximal lower extremity numbness intermittently when she wakes up in the morning.  She has also been feeling like her hands go numb over the past month, unsure if it seems related to the leg or not.  Right leg is not involved.  Plan will be to do a CSI on the left knee and see how much improvement she gets, depending on the results of the injection we may consider neurologic workup for the LLE and BUE numbness.    Future considerations may be other diagnostic vascular imaging, EMG, or possibly compartment testing. In the meantime, recommend continuing activities as tolerated, NSAIDs/Tylenol as needed for comfort and pain control, avoiding exacerbating activities, ice the popliteal fossa multiple times per day.  She will be scheduled for a left intra-articular knee joint injection next week.      Howard Werner DO, RAYMONDM  [facility] Sports Medicine  [facility] - Department of Orthopedic Surgery       Disclaimer:  This note was prepared and written using Dragon Medical dictation software. As a result, there may be errors in the script that have gone undetected. Please consider this when interpreting the information in this note.

## 2024-07-16 NOTE — PROGRESS NOTES
Kirstieocsta Stoll  :  1980  DOS: 2024  MRN: [id number]    Sports Medicine Clinic Procedure    Ultrasound Guided Left Intra-Articular Knee Injection, +/- Aspiration    Clinical History: primary osteoarthritis left knee with possible deep Baker's cyst and tibial neuropathy    Diagnosis:   1. Primary osteoarthritis of left knee        Large Joint Injection/Arthocentesis: L knee joint    Date/Time: 2024 4:36 PM    Performed by: Howard Werner DO  Authorized by: Howard Werner DO    Indications:  Osteoarthritis  Needle Size:  22 G  Guidance: ultrasound    Approach:  Anterolateral  Location:  Knee      Medications:  40 mg triamcinolone 40 MG/ML; 2 mL lidocaine 1 %; 2 mL BUPivacaine 0.5 %  Outcome:  Tolerated well, no immediate complications  Procedure discussed: discussed risks, benefits, and alternatives    Consent Given by:  Patient  Prep: patient was prepped and draped in usual sterile fashion     Ultrasound images of procedure were permanently stored.     Ultrasound Guided Intra-articular Knee Injection  The knee was prepped and draped in a sterile manner.  Ultrasound identification of the patella, suprapatellar pouch, quadriceps tendon and femur in both long and short axis was obtained. The probe was placed in short axis to the femur. A 1.5 inch 22 gauge needle was placed under ultrasound guidance into the superior knee joint using a lateral approach.  A mixture of 2 mL of 1% lidocaine, 2 mL of 0.5% bupivacaine and 1 ml kenalog (40mg/ml) was injected without difficulty. The needle was removed and there was good hemostasis without complications.  Patient tolerated procedure well.  There was ultrasound documentation of needle placement and injection.      Impression:  Successful ultrasound-guided left knee joint corticosteroid injection.    Plan:  - Injection:    - Expectations and goals of the injection were discussed and verbal and written consent was obtained.  - Performed the above procedure today  in clinic. Patient tolerated the procedure well without complications.    - Post-procedure instructions:    - Keep the injection site clean and dry.   - Do not submerge the injection site for 24 hours (no baths, pools). Showers are ok.   - Rest the area for 24-48 hours before resuming normal activities. Avoid overexerting the area for the first few weeks.   - It may take 2-3 days to start noticing the effects of the injection and up to 3-4 weeks to feel significant benefits.   - Follow up:          - In 4 to 6 weeks as needed for updates to treatment plan, or sooner for new/worsening symptoms.  - Patient has clinic contact information for questions or concerns.      Howard Werner DO, BISHNU  [facility] - Sports Medicine  [facility] - Department of Orthopedic Surgery     Disclaimer:  This note was prepared and written using Dragon Medical dictation software. As a result, there may be errors in the script that have gone undetected. Please consider this when interpreting the information in this note.

## 2024-07-17 ENCOUNTER — OFFICE VISIT (OUTPATIENT)
Dept: ORTHOPEDICS | Facility: CLINIC | Age: 44
End: 2024-07-17
Payer: COMMERCIAL

## 2024-07-17 DIAGNOSIS — M17.12 PRIMARY OSTEOARTHRITIS OF LEFT KNEE: Primary | ICD-10-CM

## 2024-07-17 PROCEDURE — 20611 DRAIN/INJ JOINT/BURSA W/US: CPT | Mod: LT | Performed by: STUDENT IN AN ORGANIZED HEALTH CARE EDUCATION/TRAINING PROGRAM

## 2024-07-17 RX ADMIN — TRIAMCINOLONE ACETONIDE 40 MG: 40 INJECTION, SUSPENSION INTRA-ARTICULAR; INTRAMUSCULAR at 16:36

## 2024-07-17 RX ADMIN — LIDOCAINE HYDROCHLORIDE 2 ML: 10 INJECTION, SOLUTION INFILTRATION; PERINEURAL at 16:36

## 2024-07-17 RX ADMIN — BUPIVACAINE HYDROCHLORIDE 2 ML: 5 INJECTION, SOLUTION PERINEURAL at 16:36

## 2024-07-17 NOTE — LETTER
July 17, 2024      Kirstie Stoll  [address] [facility] [id number]        To Whom It May Concern:    Kirstie Stoll was seen on 7/17/24 for left knee pain and an injury that will require medically necessary rest and rehabilitation.  Please excuse her from work duties for the next 1 week in order to avoid worsening the injury. She may return to work after that time as tolerated.       Sincerely,  Howard Werner DO, BISHNU  [facility] - Sports Medicine  [facility] - Department of Orthopedic Surgery

## 2024-07-17 NOTE — LETTER
2024      Kirstie Stoll  [id number] [facility] [id number]      Dear Colleague,    Thank you for referring your patient, Kirstie Stoll, to the [facility] [place]. Please see a copy of my visit note below.    Kirstie Stoll  :  1980  DOS: 2024  MRN: [id number]    Sports Medicine Clinic Procedure    Ultrasound Guided Left Intra-Articular Knee Injection, +/- Aspiration    Clinical History: primary osteoarthritis left knee with possible deep Baker's cyst and tibial neuropathy    Diagnosis:   1. Primary osteoarthritis of left knee        Large Joint Injection/Arthocentesis: L knee joint    Date/Time: 2024 4:36 PM    Performed by: Howard Werner DO  Authorized by: Howard Werner DO    Indications:  Osteoarthritis  Needle Size:  22 G  Guidance: ultrasound    Approach:  Anterolateral  Location:  Knee      Medications:  40 mg triamcinolone 40 MG/ML; 2 mL lidocaine 1 %; 2 mL BUPivacaine 0.5 %  Outcome:  Tolerated well, no immediate complications  Procedure discussed: discussed risks, benefits, and alternatives    Consent Given by:  Patient  Prep: patient was prepped and draped in usual sterile fashion     Ultrasound images of procedure were permanently stored.     Ultrasound Guided Intra-articular Knee Injection  The knee was prepped and draped in a sterile manner.  Ultrasound identification of the patella, suprapatellar pouch, quadriceps tendon and femur in both long and short axis was obtained. The probe was placed in short axis to the femur. A 1.5 inch 22 gauge needle was placed under ultrasound guidance into the superior knee joint using a lateral approach.  A mixture of 2 mL of 1% lidocaine, 2 mL of 0.5% bupivacaine and 1 ml kenalog (40mg/ml) was injected without difficulty. The needle was removed and there was good hemostasis without complications.  Patient tolerated procedure well.  There was ultrasound documentation of needle placement and injection.       Impression:  Successful ultrasound-guided left knee joint corticosteroid injection.    Plan:  - Injection:    - Expectations and goals of the injection were discussed and verbal and written consent was obtained.  - Performed the above procedure today in clinic. Patient tolerated the procedure well without complications.    - Post-procedure instructions:    - Keep the injection site clean and dry.   - Do not submerge the injection site for 24 hours (no baths, pools). Showers are ok.   - Rest the area for 24-48 hours before resuming normal activities. Avoid overexerting the area for the first few weeks.   - It may take 2-3 days to start noticing the effects of the injection and up to 3-4 weeks to feel significant benefits.   - Follow up:          - In 4 to 6 weeks as needed for updates to treatment plan, or sooner for new/worsening symptoms.  - Patient has clinic contact information for questions or concerns.      Hoawrd Werner DO, CAQSM  [facility]  [facility] - Department of Orthopedic Surgery     Disclaimer:  This note was prepared and written using Dragon Medical dictation software. As a result, there may be errors in the script that have gone undetected. Please consider this when interpreting the information in this note.       Again, thank you for allowing me to participate in the care of your patient.        Sincerely,        Howard Werner DO

## 2024-07-20 RX ORDER — LIDOCAINE HYDROCHLORIDE 10 MG/ML
2 INJECTION, SOLUTION INFILTRATION; PERINEURAL
Status: SHIPPED | OUTPATIENT
Start: 2024-07-17

## 2024-07-20 RX ORDER — BUPIVACAINE HYDROCHLORIDE 5 MG/ML
2 INJECTION, SOLUTION PERINEURAL
Status: SHIPPED | OUTPATIENT
Start: 2024-07-17

## 2024-07-20 RX ORDER — TRIAMCINOLONE ACETONIDE 40 MG/ML
40 INJECTION, SUSPENSION INTRA-ARTICULAR; INTRAMUSCULAR
Status: SHIPPED | OUTPATIENT
Start: 2024-07-17

## 2024-10-02 ENCOUNTER — OFFICE VISIT (OUTPATIENT)
Dept: FAMILY MEDICINE | Facility: OTHER | Age: 44
End: 2024-10-02
Payer: COMMERCIAL

## 2024-10-02 VITALS
HEART RATE: 94 BPM | WEIGHT: 182 LBS | TEMPERATURE: 97.7 F | HEIGHT: 64 IN | DIASTOLIC BLOOD PRESSURE: 74 MMHG | RESPIRATION RATE: 16 BRPM | BODY MASS INDEX: 31.07 KG/M2 | SYSTOLIC BLOOD PRESSURE: 128 MMHG | OXYGEN SATURATION: 98 %

## 2024-10-02 DIAGNOSIS — Z30.433 ENCOUNTER FOR REMOVAL AND REINSERTION OF INTRAUTERINE CONTRACEPTIVE DEVICE: Primary | ICD-10-CM

## 2024-10-02 DIAGNOSIS — Z12.4 CERVICAL CANCER SCREENING: ICD-10-CM

## 2024-10-02 PROCEDURE — 58300 INSERT INTRAUTERINE DEVICE: CPT | Performed by: FAMILY MEDICINE

## 2024-10-02 PROCEDURE — 87624 HPV HI-RISK TYP POOLED RSLT: CPT | Performed by: FAMILY MEDICINE

## 2024-10-02 PROCEDURE — 58301 REMOVE INTRAUTERINE DEVICE: CPT | Performed by: FAMILY MEDICINE

## 2024-10-02 PROCEDURE — G0145 SCR C/V CYTO,THINLAYER,RESCR: HCPCS | Performed by: FAMILY MEDICINE

## 2024-10-02 ASSESSMENT — PAIN SCALES - GENERAL: PAINLEVEL: NO PAIN (0)

## 2024-10-02 NOTE — PROGRESS NOTES
SUBJECTIVE:    Is a pregnancy test required: No.  Was a consent obtained?  Yes    Subjective: Kirstie Stoll is a 44 year old  presents for IUD and desires mirena type IUD.  She requests removal of the IUD because the IUD effectiveness has     Patient has been given the opportunity to ask questions about all forms of birth control, including all options appropriate for Kirstie Stoll. Discussed that no method of birth control, except abstinence is 100% effective against pregnancy or sexually transmitted infection.     Kirstie Stoll understands she may have the IUD removed at any time. IUD should be removed by a health care provider and the current IUD will be removed today.    The entire removal and insertion procedure was reviewed with the patient, including care after placement.    Today's PHQ-2 Score:       2024    12:39 PM   PHQ-2 (  [employer])   Q1: Little interest or pleasure in doing things 0   Q2: Feeling down, depressed or hopeless 0   PHQ-2 Score 0   Q1: Little interest or pleasure in doing things Not at all   Q2: Feeling down, depressed or hopeless Not at all   PHQ-2 Score 0       PROCEDURE:      A speculum exam was performed and the cervix was visualized. The IUD string was visualized. Using ring forceps, the string  was grasped and the IUD removed intact.    Under sterile technique, cervix was visualized with speculum and prepped with Betadine solution swab x 3. Tenaculum was placed for stability. The uterus was gently straightened and sounded to 6.5 cm. IUD prepared for placement, and IUD inserted according to 's instructions without difficulty or significant ressitance, and deployed at the fundus. The strings were visualized and trimmed to 3.0 cm from the external os. Tenaculum was removed and hemostasis noted. Speculum removed.  Patient tolerated procedure well.    EBL: minimal    Complications: none      POST PROCEDURE:    Given 's handouts, including when to  have IUD removed, list of danger s/sx, side effects and follow up recommended. Encouraged condom use for prevention of STD. Advised to call for any fever, for prolonged or severe pain or bleeding, abnormal vaginal dischage, or unable to palpate strings. She was advised to use pain medications (ibuprofen) as needed for mild to moderate pain. Advised to follow-up in clinic in 4-6 weeks for IUD string check if unable to find strings or as directed by provider.     Cristiana Lewis MD, MD

## 2024-10-03 LAB
HPV HR 12 DNA CVX QL NAA+PROBE: NEGATIVE
HPV16 DNA CVX QL NAA+PROBE: NEGATIVE
HPV18 DNA CVX QL NAA+PROBE: NEGATIVE
HUMAN PAPILLOMA VIRUS FINAL DIAGNOSIS: NORMAL

## 2024-10-07 LAB
BKR AP ASSOCIATED HPV REPORT: NORMAL
BKR LAB AP GYN ADEQUACY: NORMAL
BKR LAB AP GYN INTERPRETATION: NORMAL
BKR LAB AP PREVIOUS ABNORMAL: NORMAL
PATH REPORT.COMMENTS IMP SPEC: NORMAL
PATH REPORT.COMMENTS IMP SPEC: NORMAL
PATH REPORT.RELEVANT HX SPEC: NORMAL

## 2025-05-30 ENCOUNTER — OFFICE VISIT (OUTPATIENT)
Dept: FAMILY MEDICINE | Facility: OTHER | Age: 45
End: 2025-05-30
Payer: COMMERCIAL

## 2025-05-30 VITALS
OXYGEN SATURATION: 100 % | BODY MASS INDEX: 31.98 KG/M2 | DIASTOLIC BLOOD PRESSURE: 81 MMHG | WEIGHT: 180.5 LBS | HEIGHT: 63 IN | SYSTOLIC BLOOD PRESSURE: 126 MMHG | TEMPERATURE: 98 F | HEART RATE: 82 BPM

## 2025-05-30 DIAGNOSIS — Z11.59 NEED FOR HEPATITIS C SCREENING TEST: ICD-10-CM

## 2025-05-30 DIAGNOSIS — E04.9 ENLARGED THYROID GLAND: Primary | ICD-10-CM

## 2025-05-30 DIAGNOSIS — Z13.220 LIPID SCREENING: ICD-10-CM

## 2025-05-30 DIAGNOSIS — Z13.1 SCREENING FOR DIABETES MELLITUS: ICD-10-CM

## 2025-05-30 DIAGNOSIS — R22.1 NECK SWELLING: ICD-10-CM

## 2025-05-30 LAB
CHOLEST SERPL-MCNC: 197 MG/DL
EST. AVERAGE GLUCOSE BLD GHB EST-MCNC: 97 MG/DL
FASTING STATUS PATIENT QL REPORTED: YES
HBA1C MFR BLD: 5 % (ref 0–5.6)
HDLC SERPL-MCNC: 107 MG/DL
LDLC SERPL CALC-MCNC: 83 MG/DL
NONHDLC SERPL-MCNC: 90 MG/DL
TRIGL SERPL-MCNC: 36 MG/DL
TSH SERPL DL<=0.005 MIU/L-ACNC: 2.6 UIU/ML (ref 0.3–4.2)

## 2025-05-30 PROCEDURE — 99213 OFFICE O/P EST LOW 20 MIN: CPT

## 2025-05-30 PROCEDURE — 80061 LIPID PANEL: CPT

## 2025-05-30 PROCEDURE — 36415 COLL VENOUS BLD VENIPUNCTURE: CPT

## 2025-05-30 PROCEDURE — 86803 HEPATITIS C AB TEST: CPT

## 2025-05-30 PROCEDURE — 83036 HEMOGLOBIN GLYCOSYLATED A1C: CPT

## 2025-05-30 PROCEDURE — 84439 ASSAY OF FREE THYROXINE: CPT

## 2025-05-30 PROCEDURE — 84443 ASSAY THYROID STIM HORMONE: CPT

## 2025-05-30 PROCEDURE — 86376 MICROSOMAL ANTIBODY EACH: CPT

## 2025-05-30 ASSESSMENT — PAIN SCALES - GENERAL: PAINLEVEL_OUTOF10: NO PAIN (0)

## 2025-05-30 NOTE — PROGRESS NOTES
"  Assessment & Plan     Neck Swelling  Enlarged thyroid gland  Patient with possible thyroiditis. Will obtain thyroid labs and ultrasound imaging of the area.  - TSH with free T4 reflex; Future  - US Thyroid; Future  - Thyroid peroxidase antibody; Future  - TSH with free T4 reflex  - Thyroid peroxidase antibody    Screening for diabetes mellitus  - Hemoglobin A1c; Future  - Hemoglobin A1c    Need for hepatitis C screening test  - Hepatitis C Screen Reflex to HCV RNA Quant and Genotype; Future  - Hepatitis C Screen Reflex to HCV RNA Quant and Genotype    Lipid screening  - Lipid panel reflex to direct LDL Non-fasting; Future  - Lipid panel reflex to direct LDL Non-fasting  BMI  Estimated body mass index is 31.78 kg/m  as calculated from the following:    Height as of this encounter: 1.605 m (5' 3.19\").    Weight as of this encounter: 81.9 kg (180 lb 8 oz).             Amy Thacker is a 44 year old, presenting for the following health issues:  Thyroid Problem      5/30/2025     3:50 PM   Additional Questions   Roomed by silvia   Accompanied by self     Patient noticed lump 6 months ago, said it might have gotten bigger    History of Present Illness       Reason for visit:  Swollen thyroid  Symptom onset:  More than a month  Symptoms include:  Swollen  Symptom intensity:  Moderate  Symptom progression:  Staying the same  Had these symptoms before:  No   She is taking medications regularly.                      Objective    /81   Pulse 82   Temp 98  F (36.7  C) (Temporal)   Ht 1.605 m (5' 3.19\")   Wt 81.9 kg (180 lb 8 oz)   SpO2 100%   BMI 31.78 kg/m    Body mass index is 31.78 kg/m .  Physical Exam  Constitutional:       General: She is not in acute distress.     Appearance: Normal appearance. She is not ill-appearing.   Neck:      Comments: Prominence near thyroid, no distinct nodules appreciated  Cardiovascular:      Rate and Rhythm: Normal rate.   Pulmonary:      Effort: Pulmonary effort is " normal.      Breath sounds: Normal breath sounds.   Abdominal:      General: Abdomen is flat.      Palpations: Abdomen is soft.   Skin:     General: Skin is warm and dry.   Neurological:      General: No focal deficit present.      Mental Status: She is alert and oriented to person, place, and time.   Psychiatric:         Mood and Affect: Mood normal.         Behavior: Behavior normal.                    Signed Electronically by: Saleem Cage DO

## 2025-05-31 LAB — HCV AB SERPL QL IA: NONREACTIVE

## 2025-06-03 LAB
T4 FREE SERPL-MCNC: 1.23 NG/DL (ref 0.9–1.7)
THYROPEROXIDASE AB SERPL-ACNC: <10 IU/ML

## 2025-06-06 ENCOUNTER — ANCILLARY PROCEDURE (OUTPATIENT)
Dept: ULTRASOUND IMAGING | Facility: OTHER | Age: 45
End: 2025-06-06
Payer: COMMERCIAL

## 2025-06-06 DIAGNOSIS — E04.9 ENLARGED THYROID GLAND: ICD-10-CM

## 2025-06-06 PROCEDURE — 76536 US EXAM OF HEAD AND NECK: CPT | Mod: TC | Performed by: RADIOLOGY
